# Patient Record
Sex: FEMALE | Race: WHITE | NOT HISPANIC OR LATINO | Employment: OTHER | URBAN - METROPOLITAN AREA
[De-identification: names, ages, dates, MRNs, and addresses within clinical notes are randomized per-mention and may not be internally consistent; named-entity substitution may affect disease eponyms.]

---

## 2017-01-10 ENCOUNTER — ALLSCRIPTS OFFICE VISIT (OUTPATIENT)
Dept: OTHER | Facility: OTHER | Age: 57
End: 2017-01-10

## 2017-01-10 DIAGNOSIS — F41.9 ANXIETY DISORDER: ICD-10-CM

## 2017-01-10 DIAGNOSIS — M81.0 AGE-RELATED OSTEOPOROSIS WITHOUT CURRENT PATHOLOGICAL FRACTURE: ICD-10-CM

## 2017-01-10 DIAGNOSIS — E78.5 HYPERLIPIDEMIA: ICD-10-CM

## 2017-01-10 DIAGNOSIS — E55.9 VITAMIN D DEFICIENCY: ICD-10-CM

## 2017-02-10 ENCOUNTER — APPOINTMENT (OUTPATIENT)
Dept: LAB | Facility: MEDICAL CENTER | Age: 57
End: 2017-02-10
Payer: COMMERCIAL

## 2017-02-10 ENCOUNTER — HOSPITAL ENCOUNTER (OUTPATIENT)
Dept: BONE DENSITY | Facility: MEDICAL CENTER | Age: 57
Discharge: HOME | End: 2017-02-10
Payer: COMMERCIAL

## 2017-02-10 ENCOUNTER — TRANSCRIBE ORDERS (OUTPATIENT)
Dept: ADMINISTRATIVE | Facility: HOSPITAL | Age: 57
End: 2017-02-10

## 2017-02-10 DIAGNOSIS — F41.9 ANXIETY DISORDER: ICD-10-CM

## 2017-02-10 DIAGNOSIS — M81.0 AGE-RELATED OSTEOPOROSIS WITHOUT CURRENT PATHOLOGICAL FRACTURE: ICD-10-CM

## 2017-02-10 DIAGNOSIS — E55.9 VITAMIN D DEFICIENCY: ICD-10-CM

## 2017-02-10 DIAGNOSIS — E78.5 HYPERLIPIDEMIA: ICD-10-CM

## 2017-02-10 LAB
25(OH)D3 SERPL-MCNC: 32.3 NG/ML (ref 30–100)
ALBUMIN SERPL BCP-MCNC: 4 G/DL (ref 3.5–5)
ALP SERPL-CCNC: 98 U/L (ref 46–116)
ALT SERPL W P-5'-P-CCNC: 49 U/L (ref 12–78)
ANION GAP SERPL CALCULATED.3IONS-SCNC: 8 MMOL/L (ref 4–13)
AST SERPL W P-5'-P-CCNC: 28 U/L (ref 5–45)
BILIRUB SERPL-MCNC: 0.55 MG/DL (ref 0.2–1)
BUN SERPL-MCNC: 15 MG/DL (ref 5–25)
CALCIUM SERPL-MCNC: 9.1 MG/DL (ref 8.3–10.1)
CHLORIDE SERPL-SCNC: 105 MMOL/L (ref 100–108)
CHOLEST SERPL-MCNC: 228 MG/DL (ref 50–200)
CO2 SERPL-SCNC: 29 MMOL/L (ref 21–32)
CREAT SERPL-MCNC: 0.79 MG/DL (ref 0.6–1.3)
GFR SERPL CREATININE-BSD FRML MDRD: >60 ML/MIN/1.73SQ M
GLUCOSE SERPL-MCNC: 79 MG/DL (ref 65–140)
HDLC SERPL-MCNC: 66 MG/DL (ref 40–60)
LDLC SERPL CALC-MCNC: 132 MG/DL (ref 0–100)
POTASSIUM SERPL-SCNC: 3.3 MMOL/L (ref 3.5–5.3)
PROT SERPL-MCNC: 7.7 G/DL (ref 6.4–8.2)
SODIUM SERPL-SCNC: 142 MMOL/L (ref 136–145)
TRIGL SERPL-MCNC: 149 MG/DL

## 2017-02-10 PROCEDURE — 82306 VITAMIN D 25 HYDROXY: CPT

## 2017-02-10 PROCEDURE — 77080 DXA BONE DENSITY AXIAL: CPT

## 2017-02-10 PROCEDURE — 80053 COMPREHEN METABOLIC PANEL: CPT

## 2017-02-10 PROCEDURE — 36415 COLL VENOUS BLD VENIPUNCTURE: CPT

## 2017-02-10 PROCEDURE — 80061 LIPID PANEL: CPT

## 2017-02-13 ENCOUNTER — GENERIC CONVERSION - ENCOUNTER (OUTPATIENT)
Dept: OTHER | Facility: OTHER | Age: 57
End: 2017-02-13

## 2017-07-10 DIAGNOSIS — M81.0 AGE-RELATED OSTEOPOROSIS WITHOUT CURRENT PATHOLOGICAL FRACTURE: ICD-10-CM

## 2017-07-10 DIAGNOSIS — E55.9 VITAMIN D DEFICIENCY: ICD-10-CM

## 2017-07-10 DIAGNOSIS — E78.5 HYPERLIPIDEMIA: ICD-10-CM

## 2017-07-10 DIAGNOSIS — F41.9 ANXIETY DISORDER: ICD-10-CM

## 2018-01-14 VITALS
HEIGHT: 66 IN | SYSTOLIC BLOOD PRESSURE: 138 MMHG | HEART RATE: 80 BPM | BODY MASS INDEX: 27.97 KG/M2 | WEIGHT: 174 LBS | DIASTOLIC BLOOD PRESSURE: 80 MMHG

## 2018-01-16 NOTE — RESULT NOTES
Message   Please call the patient and let her know I have reviewed her blood work and DEXA results  Her lipid panel shows that her LDL is still not low enough on her statin medication  I do want to increase her Lipitor to 20 mg daily and recheck in 6 months as ordered  Vitamin D level is great continue current vitamin D over-the-counter strength daily supplementation  DEXA scan shows she still does have osteoporosis with a T score at worst to -3 1  We did discuss that if this were the case patient would try oral medication and so would like to initiate Fosamax once weekly and repeat DEXA in 2 years  Blood work is ordered for 6 months and she should have this done about one week prior to her next appointment with me  Verified Results  * DXA BONE DENSITY SPINE HIP AND PELVIS [id number] 08:09AM Gisela Woo Order Number: [id number]    - Patient Instructions: To schedule this appointment, please contact Central Scheduling at [phone]  Test Name Result Flag Reference   DXA BONE DENSITY SPINE HIP AND PELVIS (Report)     CENTRAL DXA SCAN     CLINICAL HISTORY:  61year old post-menopausal  female risk factors include estrogen deficiency  Osteoporosis  TECHNIQUE: Bone densitometry was performed using a [employer] W bone densitometer  Regions of interest appear properly placed  There are no obvious fractures or other confounding variables which could limit the study  L1 is excluded from    evaluation due to the presence of degenerative or osteoarthritic changes noted on the spine image  COMPARISON: Follow-up     RESULTS:    LUMBAR SPINE: L2-L4:   BMD 0 736 gm/cm2   T-score below normal, -3 1, serious osteoporosis, and 5% less than 2014  Z-score -1 8     LEFT TOTAL HIP:   BMD 0 70 gm/cm2   T-score below normal, -1 9   Z-score -1 1     LEFT FEMORAL NECK:   BMD 0 616 gm/cm2   T-score below normal, -2 1 and 10% less than 2014, statistically significant change     Z-score -0 9     A 25 hydroxy vitamin D level, an intact PTH, and a comprehensive metabolic panel are suggested in this patient prior to treatment  Treatment with intravenous Reclast, or oral Bisphosphonate, or Prolia are suggested for improvement in the bone density  IMPRESSION:   1  Based on the [facility] classification, this study identifies spine osteoporosis with moderate total hip and femoral neck osteopenia and the patient is considered at elevated risk for fracture  2  A daily intake of calcium of at least 1200 mg and vitamin D, 800-1000 IU, as well as weight bearing and muscle strengthening exercise, fall prevention and avoidance of tobacco and excessive alcohol intake as basic preventive measures are recommended  3  Repeat DXA scan on the same equipment in 18-24 months as clinically indicated  The 10 year risk of hip fracture is 1 1%, with the 10 year risk of major osteoporotic fracture being 17%, as calculated by the [facility] fracture risk assessment tool (FRAX)  The current NOF guidelines recommend treating patients with FRAX 10 year risk score    of >3% for hip fracture and >20% for major osteoporotic fracture  WHO CLASSIFICATION:   Normal (a T-score of -1 0 or higher)   Low bone mineral density (a T-score of less than -1 0 but higher than -2 5)   Osteoporosis (a T-score of -2 5 or less)   Severe osteoporosis (a T-score of -2 5 or less with a fragility fracture)     Thank you for allowing us the opportunity to participate in your patient care  The expanded DEXA report will no longer be arriving in your mail  If you desire to view the full report please contact [address] [place] [address] or access the PACS system          Workstation performed: [id number]     Signed by:   Aydin Christopher MD   2/10/17     (1) LIPID PANEL FASTING W DIRECT LDL REFLEX [id number] 07:58AM Annika Swenson Order Number: [id number]     Test Name Result Flag Reference   CHOLESTEROL 228 mg/dL H    LDL CHOLESTEROL CALCULATED 132 mg/dL H 0-100   - Patient Instructions: This is a fasting blood test  Water, black tea or black coffee only after 9:00pm the night before test   Drink 2 glasses of water the morning of test     - Patient Instructions: This is a fasting blood test  Water, black tea or black coffee only after 9:00pm the night before test Drink 2 glasses of water the morning of test   Triglyceride:         Normal              <150 mg/dl       Borderline High    150-199 mg/dl       High               200-499 mg/dl       Very High          >499 mg/dl  Cholesterol:         Desirable        <200 mg/dl      Borderline High  200-239 mg/dl      High             >239 mg/dl  HDL Cholesterol:        High    >59 mg/dL      Low     <41 mg/dL  LDL Cholesterol:        Optimal          <100 mg/dl        Near Optimal     100-129 mg/dl        Above Optimal          Borderline High   130-159 mg/dl          High              160-189 mg/dl          Very High        >189 mg/dl  LDL CALCULATED:    This screening LDL is a calculated result  It does not have the accuracy of the Direct Measured LDL in the monitoring of patients with hyperlipidemia and/or statin therapy  Direct Measure LDL (ZIQ702) must be ordered separately in these patients  TRIGLYCERIDES 149 mg/dL  <=150   Specimen collection should occur prior to N-Acetylcysteine or Metamizole administration due to the potential for falsely depressed results  HDL,DIRECT 66 mg/dL H 40-60   Specimen collection should occur prior to Metamizole administration due to the potential for falsely depressed results       (1) VITAMIN D 25-HYDROXY [id number] 07:58AM Altamease Pacheco    Order Number: [id number]     Test Name Result Flag Reference   VIT D 25-HYDROX 32 3 ng/mL  30 0-100 0   This assay is a certified procedure of the CDC Vitamin D Standardization Certification Program (VDSCP)     Deficiency <20ng/ml   Insufficiency 20-30ng/ml   Sufficient  ng/ml     *Patients undergoing fluorescein dye angiography may retain small amounts of fluorescein in the body for 48-72 hours post procedure  Samples containing fluorescein can produce falsely elevated Vitamin D values  If the patient had this procedure, a specimen should be resubmitted post fluorescein clearance  (1) COMPREHENSIVE METABOLIC PANEL [id number] 67:87CE Tarsha Duron    Order Number: [id number]     Test Name Result Flag Reference   GLUCOSE,RANDM 79 mg/dL     If the patient is fasting, the ADA then defines impaired fasting glucose as > 100 mg/dL and diabetes as > or equal to 123 mg/dL  SODIUM 142 mmol/L  136-145   POTASSIUM 3 3 mmol/L L 3 5-5 3   CHLORIDE 105 mmol/L  100-108   CARBON DIOXIDE 29 mmol/L  21-32   ANION GAP (CALC) 8 mmol/L  4-13   BLOOD UREA NITROGEN 15 mg/dL  5-25   CREATININE 0 79 mg/dL  0 60-1 30   Standardized to IDMS reference method   CALCIUM 9 1 mg/dL  8 3-10 1   BILI, TOTAL 0 55 mg/dL  0 20-1 00   ALK PHOSPHATAS 98 U/L     ALT (SGPT) 49 U/L  12-78   AST(SGOT) 28 U/L  5-45   ALBUMIN 4 0 g/dL  3 5-5 0   TOTAL PROTEIN 7 7 g/dL  6 4-8 2   eGFR Non-African American      >60 0 ml/min/1 73sq m   - Patient Instructions: This is a fasting blood test  Water, black tea or black coffee only after 9:00pm the night before test Drink 2 glasses of water the morning of test   National Kidney Disease Education Program recommendations are as follows:  GFR calculation is accurate only with a steady state creatinine  Chronic Kidney disease less than 60 ml/min/1 73 sq  meters  Kidney failure less than 15 ml/min/1 73 sq  meters         Plan  Hyperlipidemia    · From  Atorvastatin Calcium 10 MG Oral Tablet Take 1 tablet daily To  Atorvastatin Calcium 20 MG Oral Tablet TAKE 1 TABLET AT BEDTIME  Osteoporosis    · Alendronate Sodium 70 MG Oral Tablet; TAKE 1 TABLET ONCE WEEKLY   · * DXA BONE DENSITY SPINE HIP AND PELVIS; Status:Hold For - Scheduling;  Requested for:[id number]; Signatures   Electronically signed by : Jemma Cabezas, [facility]; Feb 13 2017  4:02PM EST                       (Author)

## 2018-01-27 NOTE — TELEPHONE ENCOUNTER
Pt  Needs labs and an OV to review  Please give her only 30 /0 ref  At a local pharmacy and then task me to make orders for labs as I can not find her chart after I send this!

## 2018-01-29 NOTE — TELEPHONE ENCOUNTER
LMOM for pt  To call back  Need a local pharmacy she would like to use and also inform her she needs OV and blood work done

## 2018-02-02 DIAGNOSIS — F32.89 OTHER DEPRESSION: Primary | ICD-10-CM

## 2018-02-02 RX ORDER — FLUOXETINE HYDROCHLORIDE 40 MG/1
CAPSULE ORAL
Qty: 90 CAPSULE | Refills: 0 | Status: SHIPPED | OUTPATIENT
Start: 2018-02-02 | End: 2018-02-27 | Stop reason: SDUPTHER

## 2018-02-07 RX ORDER — ATORVASTATIN CALCIUM 20 MG/1
TABLET, FILM COATED ORAL
Qty: 90 TABLET | Refills: 3 | OUTPATIENT
Start: 2018-02-07

## 2018-02-26 PROBLEM — M81.0 OSTEOPOROSIS: Status: ACTIVE | Noted: 2017-01-10

## 2018-02-27 ENCOUNTER — OFFICE VISIT (OUTPATIENT)
Dept: FAMILY MEDICINE CLINIC | Facility: CLINIC | Age: 58
End: 2018-02-27
Payer: COMMERCIAL

## 2018-02-27 VITALS
BODY MASS INDEX: 26.29 KG/M2 | HEART RATE: 64 BPM | WEIGHT: 163.6 LBS | SYSTOLIC BLOOD PRESSURE: 122 MMHG | DIASTOLIC BLOOD PRESSURE: 70 MMHG | HEIGHT: 66 IN

## 2018-02-27 DIAGNOSIS — E78.2 MIXED HYPERLIPIDEMIA: ICD-10-CM

## 2018-02-27 DIAGNOSIS — F32.89 OTHER DEPRESSION: ICD-10-CM

## 2018-02-27 DIAGNOSIS — M81.0 AGE-RELATED OSTEOPOROSIS WITHOUT CURRENT PATHOLOGICAL FRACTURE: Primary | ICD-10-CM

## 2018-02-27 DIAGNOSIS — F41.9 ANXIETY: ICD-10-CM

## 2018-02-27 DIAGNOSIS — Z12.39 BREAST CANCER SCREENING: ICD-10-CM

## 2018-02-27 DIAGNOSIS — E55.9 VITAMIN D DEFICIENCY: ICD-10-CM

## 2018-02-27 DIAGNOSIS — J30.9 ALLERGIC RHINITIS, UNSPECIFIED CHRONICITY, UNSPECIFIED SEASONALITY, UNSPECIFIED TRIGGER: ICD-10-CM

## 2018-02-27 PROCEDURE — 99214 OFFICE O/P EST MOD 30 MIN: CPT | Performed by: PHYSICIAN ASSISTANT

## 2018-02-27 PROCEDURE — 3008F BODY MASS INDEX DOCD: CPT | Performed by: PHYSICIAN ASSISTANT

## 2018-02-27 RX ORDER — ALPRAZOLAM 0.5 MG/1
0.5 TABLET ORAL 2 TIMES DAILY PRN
Qty: 30 TABLET | Refills: 0 | Status: SHIPPED | OUTPATIENT
Start: 2018-02-27 | End: 2018-09-04 | Stop reason: SDUPTHER

## 2018-02-27 RX ORDER — FLUOXETINE HYDROCHLORIDE 40 MG/1
40 CAPSULE ORAL DAILY
Qty: 90 CAPSULE | Refills: 3 | Status: SHIPPED | OUTPATIENT
Start: 2018-02-27 | End: 2019-04-08 | Stop reason: SDUPTHER

## 2018-02-27 RX ORDER — ATORVASTATIN CALCIUM 20 MG/1
20 TABLET, FILM COATED ORAL DAILY
Qty: 90 TABLET | Refills: 3 | Status: SHIPPED | OUTPATIENT
Start: 2018-02-27 | End: 2019-02-24 | Stop reason: SDUPTHER

## 2018-02-27 RX ORDER — ERGOCALCIFEROL (VITAMIN D2) 10 MCG
1 TABLET ORAL DAILY
COMMUNITY

## 2018-02-27 RX ORDER — FLUTICASONE PROPIONATE 50 MCG
2 SPRAY, SUSPENSION (ML) NASAL DAILY
Qty: 16 G | Refills: 11 | Status: SHIPPED | OUTPATIENT
Start: 2018-02-27 | End: 2018-09-04 | Stop reason: ALTCHOICE

## 2018-02-27 RX ORDER — ATORVASTATIN CALCIUM 20 MG/1
1 TABLET, FILM COATED ORAL
COMMUNITY
Start: 2015-03-02 | End: 2018-02-27 | Stop reason: SDUPTHER

## 2018-02-27 RX ORDER — ALPRAZOLAM 0.5 MG/1
1 TABLET ORAL
COMMUNITY
Start: 2015-03-02 | End: 2018-02-27 | Stop reason: SDUPTHER

## 2018-02-27 RX ORDER — VITAMIN E 268 MG
CAPSULE ORAL
COMMUNITY

## 2018-02-27 RX ORDER — FLUTICASONE PROPIONATE 50 MCG
SPRAY, SUSPENSION (ML) NASAL
COMMUNITY
Start: 2011-09-24 | End: 2018-02-27 | Stop reason: SDUPTHER

## 2018-02-27 NOTE — PATIENT INSTRUCTIONS
Problem List Items Addressed This Visit     Anxiety    Mixed hyperlipidemia     Overdue for labs  Check now and again in 6 months continue current medication call with results  Relevant Medications    atorvastatin (LIPITOR) 20 mg tablet    Other Relevant Orders    Lipid Panel with Direct LDL reflex    Comprehensive metabolic panel    Comprehensive metabolic panel    Lipid Panel with Direct LDL reflex    Vitamin D deficiency     Check level now and again in 6 months continue supplementation  Relevant Orders    Vitamin D 25 hydroxy    Vitamin D 25 hydroxy    Age-related osteoporosis without current pathological fracture - Primary     DEXA is due February 2019           Breast cancer screening    Relevant Orders    Mammo screening bilateral w cad

## 2018-02-27 NOTE — PROGRESS NOTES
Assessment/Plan:    Age-related osteoporosis without current pathological fracture  DEXA is due February 2019  Vitamin D deficiency  Check level now and again in 6 months continue supplementation  Mixed hyperlipidemia  Overdue for labs  Check now and again in 6 months continue current medication call with results  Diagnoses and all orders for this visit:    Age-related osteoporosis without current pathological fracture    Mixed hyperlipidemia  -     Lipid Panel with Direct LDL reflex; Future  -     Comprehensive metabolic panel; Future  -     Comprehensive metabolic panel; Future  -     Lipid Panel with Direct LDL reflex; Future    Vitamin D deficiency  -     Vitamin D 25 hydroxy; Future  -     Vitamin D 25 hydroxy; Future    Anxiety    Breast cancer screening  -     Mammo screening bilateral w cad; Future    Other orders  -     ALPRAZolam (XANAX) 0 5 mg tablet; Take 1 tablet by mouth  -     aspirin 81 MG tablet; Take 1 tablet by mouth daily  -     atorvastatin (LIPITOR) 20 mg tablet; Take 1 tablet by mouth  -     Cholecalciferol ([employer] VITAMIN D) 2000 units CAPS; Take 1 capsule by mouth daily  -     vitamin E, tocopherol, 400 units capsule; Take by mouth  -     Multiple Vitamin (DAILY VALUE MULTIVITAMIN) TABS; Take 1 tablet by mouth daily  -     Omega-3 Fatty Acids (FISH OIL) 600 MG CAPS; Take by mouth  -     fluticasone (FLONASE) 50 mcg/act nasal spray; into each nostril          Subjective:   CC: c/o Pt  Here for routine follow up  No concerns noted  rayo     Patient ID: Suly Stark is a 61 y o  female  Suly Stark is here for chronic conditions f/u including the diagnosis of No diagnosis found    Pt  states they are taking all medications as directed without complaints or side effects             The following portions of the patient's history were reviewed and updated as appropriate: allergies, current medications, past family history, past medical history, past social history, past surgical history and problem list     Review of Systems   Constitutional: Negative  HENT: Negative  Eyes: Negative  Respiratory: Negative  Cardiovascular: Negative  Gastrointestinal: Negative  Endocrine: Negative  Genitourinary: Negative  Musculoskeletal: Negative  Skin: Negative  Allergic/Immunologic: Negative  Neurological: Negative  Hematological: Negative  Psychiatric/Behavioral: Negative  Objective:      Vitals:    02/27/18 1303   BP: 122/70   BP Location: Left arm   Patient Position: Sitting   Pulse: 64   Weight: 74 2 kg (163 lb 9 6 oz)   Height: 5' 6 25" (1 683 m)            Physical Exam   Constitutional: She is oriented to person, place, and time  She appears well-developed and well-nourished  No distress  HENT:   Head: Normocephalic and atraumatic  Eyes: Conjunctivae are normal  Right eye exhibits no discharge  Left eye exhibits no discharge  Neck: Neck supple  Carotid bruit is not present  Cardiovascular: Normal rate, regular rhythm and normal heart sounds  Exam reveals no gallop and no friction rub  No murmur heard  Pulmonary/Chest: Effort normal and breath sounds normal  No respiratory distress  She has no wheezes  She has no rales  Neurological: She is alert and oriented to person, place, and time  Skin: Skin is warm and dry  She is not diaphoretic  Psychiatric: She has a normal mood and affect  Judgment normal    Nursing note and vitals reviewed

## 2018-05-08 ENCOUNTER — TRANSCRIBE ORDERS (OUTPATIENT)
Dept: ADMINISTRATIVE | Facility: HOSPITAL | Age: 58
End: 2018-05-08

## 2018-05-08 ENCOUNTER — APPOINTMENT (OUTPATIENT)
Dept: LAB | Facility: MEDICAL CENTER | Age: 58
End: 2018-05-08
Payer: COMMERCIAL

## 2018-05-08 DIAGNOSIS — E55.9 VITAMIN D DEFICIENCY: ICD-10-CM

## 2018-05-08 DIAGNOSIS — E78.2 MIXED HYPERLIPIDEMIA: ICD-10-CM

## 2018-05-08 LAB
25(OH)D3 SERPL-MCNC: 26.3 NG/ML (ref 30–100)
ALBUMIN SERPL BCP-MCNC: 3.8 G/DL (ref 3.5–5)
ALP SERPL-CCNC: 88 U/L (ref 46–116)
ALT SERPL W P-5'-P-CCNC: 42 U/L (ref 12–78)
ANION GAP SERPL CALCULATED.3IONS-SCNC: 6 MMOL/L (ref 4–13)
AST SERPL W P-5'-P-CCNC: 22 U/L (ref 5–45)
BILIRUB SERPL-MCNC: 0.67 MG/DL (ref 0.2–1)
BUN SERPL-MCNC: 17 MG/DL (ref 5–25)
CALCIUM SERPL-MCNC: 9.3 MG/DL (ref 8.3–10.1)
CHLORIDE SERPL-SCNC: 107 MMOL/L (ref 100–108)
CHOLEST SERPL-MCNC: 188 MG/DL (ref 50–200)
CO2 SERPL-SCNC: 28 MMOL/L (ref 21–32)
CREAT SERPL-MCNC: 0.72 MG/DL (ref 0.6–1.3)
GFR SERPL CREATININE-BSD FRML MDRD: 92 ML/MIN/1.73SQ M
GLUCOSE P FAST SERPL-MCNC: 93 MG/DL (ref 65–99)
HDLC SERPL-MCNC: 61 MG/DL (ref 40–60)
LDLC SERPL CALC-MCNC: 108 MG/DL (ref 0–100)
POTASSIUM SERPL-SCNC: 3.6 MMOL/L (ref 3.5–5.3)
PROT SERPL-MCNC: 7.3 G/DL (ref 6.4–8.2)
SODIUM SERPL-SCNC: 141 MMOL/L (ref 136–145)
TRIGL SERPL-MCNC: 94 MG/DL

## 2018-05-08 PROCEDURE — 36415 COLL VENOUS BLD VENIPUNCTURE: CPT

## 2018-05-08 PROCEDURE — 80061 LIPID PANEL: CPT

## 2018-05-08 PROCEDURE — 80053 COMPREHEN METABOLIC PANEL: CPT

## 2018-05-08 PROCEDURE — 82306 VITAMIN D 25 HYDROXY: CPT

## 2018-05-10 ENCOUNTER — TELEPHONE (OUTPATIENT)
Dept: FAMILY MEDICINE CLINIC | Facility: CLINIC | Age: 58
End: 2018-05-10

## 2018-08-31 ENCOUNTER — APPOINTMENT (OUTPATIENT)
Dept: LAB | Facility: MEDICAL CENTER | Age: 58
End: 2018-08-31
Payer: COMMERCIAL

## 2018-08-31 DIAGNOSIS — E55.9 VITAMIN D DEFICIENCY: ICD-10-CM

## 2018-08-31 DIAGNOSIS — E78.2 MIXED HYPERLIPIDEMIA: ICD-10-CM

## 2018-08-31 LAB
25(OH)D3 SERPL-MCNC: 37.3 NG/ML (ref 30–100)
ALBUMIN SERPL BCP-MCNC: 3.8 G/DL (ref 3.5–5)
ALP SERPL-CCNC: 93 U/L (ref 46–116)
ALT SERPL W P-5'-P-CCNC: 47 U/L (ref 12–78)
ANION GAP SERPL CALCULATED.3IONS-SCNC: 6 MMOL/L (ref 4–13)
AST SERPL W P-5'-P-CCNC: 24 U/L (ref 5–45)
BILIRUB SERPL-MCNC: 0.68 MG/DL (ref 0.2–1)
BUN SERPL-MCNC: 14 MG/DL (ref 5–25)
CALCIUM SERPL-MCNC: 9 MG/DL (ref 8.3–10.1)
CHLORIDE SERPL-SCNC: 103 MMOL/L (ref 100–108)
CHOLEST SERPL-MCNC: 221 MG/DL (ref 50–200)
CO2 SERPL-SCNC: 29 MMOL/L (ref 21–32)
CREAT SERPL-MCNC: 0.77 MG/DL (ref 0.6–1.3)
GFR SERPL CREATININE-BSD FRML MDRD: 85 ML/MIN/1.73SQ M
GLUCOSE P FAST SERPL-MCNC: 83 MG/DL (ref 65–99)
HDLC SERPL-MCNC: 59 MG/DL (ref 40–60)
LDLC SERPL CALC-MCNC: 124 MG/DL (ref 0–100)
POTASSIUM SERPL-SCNC: 3.7 MMOL/L (ref 3.5–5.3)
PROT SERPL-MCNC: 7.4 G/DL (ref 6.4–8.2)
SODIUM SERPL-SCNC: 138 MMOL/L (ref 136–145)
TRIGL SERPL-MCNC: 191 MG/DL

## 2018-08-31 PROCEDURE — 80061 LIPID PANEL: CPT

## 2018-08-31 PROCEDURE — 36415 COLL VENOUS BLD VENIPUNCTURE: CPT

## 2018-08-31 PROCEDURE — 82306 VITAMIN D 25 HYDROXY: CPT

## 2018-08-31 PROCEDURE — 80053 COMPREHEN METABOLIC PANEL: CPT

## 2018-09-04 ENCOUNTER — OFFICE VISIT (OUTPATIENT)
Dept: FAMILY MEDICINE CLINIC | Facility: CLINIC | Age: 58
End: 2018-09-04
Payer: COMMERCIAL

## 2018-09-04 VITALS
SYSTOLIC BLOOD PRESSURE: 124 MMHG | BODY MASS INDEX: 26.08 KG/M2 | DIASTOLIC BLOOD PRESSURE: 70 MMHG | WEIGHT: 162.8 LBS | HEART RATE: 72 BPM

## 2018-09-04 DIAGNOSIS — F41.9 ANXIETY: ICD-10-CM

## 2018-09-04 DIAGNOSIS — E55.9 VITAMIN D DEFICIENCY: Primary | ICD-10-CM

## 2018-09-04 DIAGNOSIS — E78.2 MIXED HYPERLIPIDEMIA: ICD-10-CM

## 2018-09-04 DIAGNOSIS — M81.0 AGE-RELATED OSTEOPOROSIS WITHOUT CURRENT PATHOLOGICAL FRACTURE: ICD-10-CM

## 2018-09-04 DIAGNOSIS — Z12.39 BREAST CANCER SCREENING: ICD-10-CM

## 2018-09-04 PROCEDURE — 99214 OFFICE O/P EST MOD 30 MIN: CPT | Performed by: PHYSICIAN ASSISTANT

## 2018-09-04 PROCEDURE — 1036F TOBACCO NON-USER: CPT | Performed by: PHYSICIAN ASSISTANT

## 2018-09-04 RX ORDER — ALPRAZOLAM 0.5 MG/1
0.5 TABLET ORAL 2 TIMES DAILY PRN
Qty: 30 TABLET | Refills: 0 | Status: SHIPPED | OUTPATIENT
Start: 2018-09-04 | End: 2019-04-16 | Stop reason: SDUPTHER

## 2018-09-04 NOTE — ASSESSMENT & PLAN NOTE
however triglycerides elevated at 191  Decrease carbohydrate intake continue low-fat low-cholesterol diet and medication and recheck in 6 months

## 2018-09-04 NOTE — PATIENT INSTRUCTIONS
Problem List Items Addressed This Visit        Musculoskeletal and Integument    Age-related osteoporosis without current pathological fracture     Dexa due 2/2019  Other    Anxiety    Relevant Medications    ALPRAZolam (XANAX) 0 5 mg tablet    Mixed hyperlipidemia      however triglycerides elevated at 191  Decrease carbohydrate intake continue low-fat low-cholesterol diet and medication and recheck in 6 months  Relevant Orders    Lipid Panel with Direct LDL reflex    Comprehensive metabolic panel    Vitamin D deficiency - Primary     Stable with a vitamin-D level of 37  Recheck 1 year continue current supplementation  Relevant Orders    Vitamin D 25 hydroxy    Breast cancer screening     Mammo overdue

## 2018-09-04 NOTE — PROGRESS NOTES
Assessment/Plan:    Age-related osteoporosis without current pathological fracture  Dexa due 2/2019  Breast cancer screening  Mammo overdue  Mixed hyperlipidemia   however triglycerides elevated at 191  Decrease carbohydrate intake continue low-fat low-cholesterol diet and medication and recheck in 6 months  Vitamin D deficiency  Stable with a vitamin-D level of 37  Recheck 1 year continue current supplementation  Diagnoses and all orders for this visit:    Vitamin D deficiency  -     Vitamin D 25 hydroxy; Future    Mixed hyperlipidemia  -     Lipid Panel with Direct LDL reflex; Future  -     Comprehensive metabolic panel; Future    Age-related osteoporosis without current pathological fracture    Anxiety  -     ALPRAZolam (XANAX) 0 5 mg tablet; Take 1 tablet (0 5 mg total) by mouth 2 (two) times a day as needed for anxiety for up to 30 days    Breast cancer screening    Other orders  -     Cancel: Lipid Panel with Direct LDL reflex; Future  -     Cancel: Comprehensive metabolic panel; Future  -     Cancel: Vitamin D 25 hydroxy; Future          Subjective:   CC: 6 month follow up for chronic conditions and review blood work  rayo     Patient ID: Hilda Flores is a 60 y o  female  Hilda Flores is here for chronic conditions f/u including the diagnosis of Vitamin d deficiency  (primary encounter diagnosis)  Mixed hyperlipidemia  Age-related osteoporosis without current pathological fracture  Anxiety  Breast cancer screening   Pt  states they are taking all medications as directed without complaints or side effects   Pt  had labs done prior to today's visit which included Recent Results (from the past 672 hour(s))  -Comprehensive metabolic panel  Collection Time: 08/31/18 10:42 AM       Result                      Value             Ref Range           Sodium                      138               136 - 145 mm*       Potassium                   3 7               3 5 - 5 3 mm*       Chloride 103               100 - 108 mm*       CO2                         29                21 - 32 mmol*       ANION GAP                   6                 4 - 13 mmol/L       BUN                         14                5 - 25 mg/dL        Creatinine                  0 77              0 60 - 1 30 *       Glucose, Fasting            83                65 - 99 mg/dL       Calcium                     9 0               8 3 - 10 1 m*       AST                         24                5 - 45 U/L          ALT                         47                12 - 78 U/L         Alkaline Phosphatase        93                46 - 116 U/L        Total Protein               7 4               6 4 - 8 2 g/*       Albumin                     3 8               3 5 - 5 0 g/*       Total Bilirubin             0 68              0 20 - 1 00 *       eGFR                        85                ml/min/1 73s*  -Lipid Panel with Direct LDL reflex  Collection Time: 08/31/18 10:42 AM       Result                      Value             Ref Range           Cholesterol                 221 (H)           50 - 200 mg/*       Triglycerides               191 (H)           <=150 mg/dL         HDL, Direct                 59                40 - 60 mg/dL       LDL Calculated              124 (H)           0 - 100 mg/dL  -Vitamin D 25 hydroxy  Collection Time: 08/31/18 10:42 AM       Result                      Value             Ref Range           Vit D, 25-Hydroxy           37 3              30 0 - 100 0*      Pt  admits to eating a lot of crackers and cheese since this summer when sitting out on the porch with her   The following portions of the patient's history were reviewed and updated as appropriate: allergies, current medications, past family history, past medical history, past social history, past surgical history and problem list     Review of Systems   Constitutional: Negative  HENT: Negative  Eyes: Negative  Respiratory: Negative  Cardiovascular: Negative  Gastrointestinal: Negative  Endocrine: Negative  Genitourinary: Negative  Musculoskeletal: Negative  Skin: Negative  Allergic/Immunologic: Negative  Neurological: Negative  Hematological: Negative  Psychiatric/Behavioral: Negative  Objective:      Vitals:    09/04/18 1203   BP: 124/70   BP Location: Left arm   Patient Position: Sitting   Pulse: 72   Weight: 73 8 kg (162 lb 12 8 oz)            Physical Exam   Constitutional: She is oriented to person, place, and time  She appears well-developed and well-nourished  No distress  HENT:   Head: Normocephalic and atraumatic  Eyes: Conjunctivae are normal  Right eye exhibits no discharge  Left eye exhibits no discharge  Neck: Neck supple  Carotid bruit is not present  Cardiovascular: Normal rate, regular rhythm and normal heart sounds  Exam reveals no gallop and no friction rub  No murmur heard  Pulmonary/Chest: Effort normal and breath sounds normal  No respiratory distress  She has no wheezes  She has no rales  Neurological: She is alert and oriented to person, place, and time  Skin: Skin is warm and dry  She is not diaphoretic  Psychiatric: She has a normal mood and affect  Judgment normal    Nursing note and vitals reviewed

## 2019-02-13 DIAGNOSIS — M81.0 AGE-RELATED OSTEOPOROSIS WITHOUT CURRENT PATHOLOGICAL FRACTURE: ICD-10-CM

## 2019-02-24 DIAGNOSIS — E78.2 MIXED HYPERLIPIDEMIA: ICD-10-CM

## 2019-02-24 RX ORDER — ATORVASTATIN CALCIUM 20 MG/1
TABLET, FILM COATED ORAL
Qty: 90 TABLET | Refills: 3 | Status: SHIPPED | OUTPATIENT
Start: 2019-02-24 | End: 2019-04-16 | Stop reason: SDUPTHER

## 2019-04-08 DIAGNOSIS — F32.89 OTHER DEPRESSION: ICD-10-CM

## 2019-04-08 RX ORDER — FLUOXETINE HYDROCHLORIDE 40 MG/1
CAPSULE ORAL
Qty: 90 CAPSULE | Refills: 3 | Status: SHIPPED | OUTPATIENT
Start: 2019-04-08 | End: 2019-04-16 | Stop reason: SDUPTHER

## 2019-04-09 ENCOUNTER — TRANSCRIBE ORDERS (OUTPATIENT)
Dept: ADMINISTRATIVE | Facility: HOSPITAL | Age: 59
End: 2019-04-09

## 2019-04-09 DIAGNOSIS — M81.0 SENILE OSTEOPOROSIS: Primary | ICD-10-CM

## 2019-04-10 ENCOUNTER — APPOINTMENT (OUTPATIENT)
Dept: LAB | Facility: MEDICAL CENTER | Age: 59
End: 2019-04-10
Payer: COMMERCIAL

## 2019-04-10 DIAGNOSIS — E78.2 MIXED HYPERLIPIDEMIA: ICD-10-CM

## 2019-04-10 DIAGNOSIS — E55.9 VITAMIN D DEFICIENCY: ICD-10-CM

## 2019-04-10 LAB
25(OH)D3 SERPL-MCNC: 45.1 NG/ML (ref 30–100)
ALBUMIN SERPL BCP-MCNC: 4.2 G/DL (ref 3.5–5)
ALP SERPL-CCNC: 94 U/L (ref 46–116)
ALT SERPL W P-5'-P-CCNC: 41 U/L (ref 12–78)
ANION GAP SERPL CALCULATED.3IONS-SCNC: 5 MMOL/L (ref 4–13)
AST SERPL W P-5'-P-CCNC: 23 U/L (ref 5–45)
BILIRUB SERPL-MCNC: 0.81 MG/DL (ref 0.2–1)
BUN SERPL-MCNC: 13 MG/DL (ref 5–25)
CALCIUM SERPL-MCNC: 9.2 MG/DL (ref 8.3–10.1)
CHLORIDE SERPL-SCNC: 107 MMOL/L (ref 100–108)
CHOLEST SERPL-MCNC: 243 MG/DL (ref 50–200)
CO2 SERPL-SCNC: 29 MMOL/L (ref 21–32)
CREAT SERPL-MCNC: 0.78 MG/DL (ref 0.6–1.3)
GFR SERPL CREATININE-BSD FRML MDRD: 83 ML/MIN/1.73SQ M
GLUCOSE P FAST SERPL-MCNC: 87 MG/DL (ref 65–99)
HDLC SERPL-MCNC: 67 MG/DL (ref 40–60)
LDLC SERPL CALC-MCNC: 149 MG/DL (ref 0–100)
POTASSIUM SERPL-SCNC: 4.2 MMOL/L (ref 3.5–5.3)
PROT SERPL-MCNC: 7.8 G/DL (ref 6.4–8.2)
SODIUM SERPL-SCNC: 141 MMOL/L (ref 136–145)
TRIGL SERPL-MCNC: 137 MG/DL

## 2019-04-10 PROCEDURE — 80053 COMPREHEN METABOLIC PANEL: CPT

## 2019-04-10 PROCEDURE — 82306 VITAMIN D 25 HYDROXY: CPT

## 2019-04-10 PROCEDURE — 80061 LIPID PANEL: CPT

## 2019-04-10 PROCEDURE — 36415 COLL VENOUS BLD VENIPUNCTURE: CPT

## 2019-04-16 ENCOUNTER — OFFICE VISIT (OUTPATIENT)
Dept: FAMILY MEDICINE CLINIC | Facility: CLINIC | Age: 59
End: 2019-04-16
Payer: COMMERCIAL

## 2019-04-16 VITALS
SYSTOLIC BLOOD PRESSURE: 122 MMHG | WEIGHT: 159.8 LBS | HEART RATE: 84 BPM | HEIGHT: 66 IN | DIASTOLIC BLOOD PRESSURE: 70 MMHG | BODY MASS INDEX: 25.68 KG/M2

## 2019-04-16 DIAGNOSIS — M81.0 AGE-RELATED OSTEOPOROSIS WITHOUT CURRENT PATHOLOGICAL FRACTURE: ICD-10-CM

## 2019-04-16 DIAGNOSIS — F41.9 ANXIETY: ICD-10-CM

## 2019-04-16 DIAGNOSIS — E55.9 VITAMIN D DEFICIENCY: Primary | ICD-10-CM

## 2019-04-16 DIAGNOSIS — F32.89 OTHER DEPRESSION: ICD-10-CM

## 2019-04-16 DIAGNOSIS — Z12.31 SCREENING MAMMOGRAM, ENCOUNTER FOR: ICD-10-CM

## 2019-04-16 DIAGNOSIS — E78.2 MIXED HYPERLIPIDEMIA: ICD-10-CM

## 2019-04-16 PROCEDURE — 3008F BODY MASS INDEX DOCD: CPT | Performed by: PHYSICIAN ASSISTANT

## 2019-04-16 PROCEDURE — 99214 OFFICE O/P EST MOD 30 MIN: CPT | Performed by: PHYSICIAN ASSISTANT

## 2019-04-16 RX ORDER — ATORVASTATIN CALCIUM 40 MG/1
20 TABLET, FILM COATED ORAL DAILY
Qty: 90 TABLET | Refills: 3 | Status: SHIPPED | OUTPATIENT
Start: 2019-04-16 | End: 2019-06-12 | Stop reason: DRUGHIGH

## 2019-04-16 RX ORDER — ALPRAZOLAM 0.5 MG/1
0.5 TABLET ORAL 2 TIMES DAILY PRN
Qty: 30 TABLET | Refills: 0 | Status: SHIPPED | OUTPATIENT
Start: 2019-04-16 | End: 2020-05-05 | Stop reason: SDUPTHER

## 2019-04-16 RX ORDER — FLUOXETINE HYDROCHLORIDE 40 MG/1
40 CAPSULE ORAL DAILY
Qty: 90 CAPSULE | Refills: 3 | Status: SHIPPED | OUTPATIENT
Start: 2019-04-16 | End: 2020-06-09

## 2019-05-16 ENCOUNTER — HOSPITAL ENCOUNTER (OUTPATIENT)
Dept: BONE DENSITY | Facility: MEDICAL CENTER | Age: 59
Discharge: HOME | End: 2019-05-16
Payer: COMMERCIAL

## 2019-05-16 ENCOUNTER — HOSPITAL ENCOUNTER (OUTPATIENT)
Dept: MAMMOGRAPHY | Facility: MEDICAL CENTER | Age: 59
Discharge: HOME | End: 2019-05-16
Payer: COMMERCIAL

## 2019-05-16 VITALS — HEIGHT: 66 IN | WEIGHT: 159.75 LBS | BODY MASS INDEX: 25.67 KG/M2

## 2019-05-16 DIAGNOSIS — M81.0 SENILE OSTEOPOROSIS: ICD-10-CM

## 2019-05-16 DIAGNOSIS — Z12.31 SCREENING MAMMOGRAM, ENCOUNTER FOR: ICD-10-CM

## 2019-05-16 PROCEDURE — 77063 BREAST TOMOSYNTHESIS BI: CPT

## 2019-05-16 PROCEDURE — 77080 DXA BONE DENSITY AXIAL: CPT

## 2019-05-16 PROCEDURE — 77067 SCR MAMMO BI INCL CAD: CPT

## 2019-05-21 ENCOUNTER — TELEPHONE (OUTPATIENT)
Dept: FAMILY MEDICINE CLINIC | Facility: CLINIC | Age: 59
End: 2019-05-21

## 2019-05-21 DIAGNOSIS — M81.0 AGE-RELATED OSTEOPOROSIS WITHOUT CURRENT PATHOLOGICAL FRACTURE: Primary | ICD-10-CM

## 2019-06-12 DIAGNOSIS — E78.2 MIXED HYPERLIPIDEMIA: Primary | ICD-10-CM

## 2019-06-13 RX ORDER — ATORVASTATIN CALCIUM 40 MG/1
40 TABLET, FILM COATED ORAL DAILY
Qty: 90 TABLET | Refills: 3 | Status: SHIPPED | OUTPATIENT
Start: 2019-06-13 | End: 2020-06-09

## 2019-07-25 ENCOUNTER — APPOINTMENT (OUTPATIENT)
Dept: LAB | Facility: CLINIC | Age: 59
End: 2019-07-25
Payer: COMMERCIAL

## 2019-07-25 DIAGNOSIS — E78.2 MIXED HYPERLIPIDEMIA: ICD-10-CM

## 2019-07-25 LAB
ALBUMIN SERPL BCP-MCNC: 4 G/DL (ref 3.5–5)
ALP SERPL-CCNC: 98 U/L (ref 46–116)
ALT SERPL W P-5'-P-CCNC: 57 U/L (ref 12–78)
ANION GAP SERPL CALCULATED.3IONS-SCNC: 5 MMOL/L (ref 4–13)
AST SERPL W P-5'-P-CCNC: 31 U/L (ref 5–45)
BILIRUB SERPL-MCNC: 0.52 MG/DL (ref 0.2–1)
BUN SERPL-MCNC: 14 MG/DL (ref 5–25)
CALCIUM SERPL-MCNC: 8.9 MG/DL (ref 8.3–10.1)
CHLORIDE SERPL-SCNC: 104 MMOL/L (ref 100–108)
CHOLEST SERPL-MCNC: 200 MG/DL (ref 50–200)
CO2 SERPL-SCNC: 28 MMOL/L (ref 21–32)
CREAT SERPL-MCNC: 0.77 MG/DL (ref 0.6–1.3)
GFR SERPL CREATININE-BSD FRML MDRD: 84 ML/MIN/1.73SQ M
GLUCOSE P FAST SERPL-MCNC: 80 MG/DL (ref 65–99)
HDLC SERPL-MCNC: 57 MG/DL (ref 40–60)
LDLC SERPL CALC-MCNC: 119 MG/DL (ref 0–100)
POTASSIUM SERPL-SCNC: 3.7 MMOL/L (ref 3.5–5.3)
PROT SERPL-MCNC: 7.4 G/DL (ref 6.4–8.2)
SODIUM SERPL-SCNC: 137 MMOL/L (ref 136–145)
TRIGL SERPL-MCNC: 118 MG/DL

## 2019-07-25 PROCEDURE — 36415 COLL VENOUS BLD VENIPUNCTURE: CPT

## 2019-07-25 PROCEDURE — 80061 LIPID PANEL: CPT

## 2019-07-25 PROCEDURE — 80053 COMPREHEN METABOLIC PANEL: CPT

## 2019-07-31 ENCOUNTER — CONSULT (OUTPATIENT)
Dept: ENDOCRINOLOGY | Facility: CLINIC | Age: 59
End: 2019-07-31
Payer: COMMERCIAL

## 2019-07-31 ENCOUNTER — LAB (OUTPATIENT)
Dept: LAB | Facility: CLINIC | Age: 59
End: 2019-07-31
Payer: COMMERCIAL

## 2019-07-31 VITALS
WEIGHT: 161.8 LBS | SYSTOLIC BLOOD PRESSURE: 128 MMHG | BODY MASS INDEX: 26.96 KG/M2 | HEIGHT: 65 IN | HEART RATE: 80 BPM | DIASTOLIC BLOOD PRESSURE: 82 MMHG

## 2019-07-31 DIAGNOSIS — E55.9 VITAMIN D DEFICIENCY: ICD-10-CM

## 2019-07-31 DIAGNOSIS — M81.0 AGE-RELATED OSTEOPOROSIS WITHOUT CURRENT PATHOLOGICAL FRACTURE: Primary | ICD-10-CM

## 2019-07-31 DIAGNOSIS — R53.82 CHRONIC FATIGUE, UNSPECIFIED: ICD-10-CM

## 2019-07-31 DIAGNOSIS — M81.0 AGE-RELATED OSTEOPOROSIS WITHOUT CURRENT PATHOLOGICAL FRACTURE: ICD-10-CM

## 2019-07-31 LAB
T4 FREE SERPL-MCNC: 0.99 NG/DL (ref 0.76–1.46)
TSH SERPL DL<=0.05 MIU/L-ACNC: 3.44 UIU/ML (ref 0.36–3.74)

## 2019-07-31 PROCEDURE — 99244 OFF/OP CNSLTJ NEW/EST MOD 40: CPT | Performed by: INTERNAL MEDICINE

## 2019-07-31 PROCEDURE — 84165 PROTEIN E-PHORESIS SERUM: CPT

## 2019-07-31 PROCEDURE — 84439 ASSAY OF FREE THYROXINE: CPT

## 2019-07-31 PROCEDURE — 84166 PROTEIN E-PHORESIS/URINE/CSF: CPT

## 2019-07-31 PROCEDURE — 84166 PROTEIN E-PHORESIS/URINE/CSF: CPT | Performed by: PATHOLOGY

## 2019-07-31 PROCEDURE — 84165 PROTEIN E-PHORESIS SERUM: CPT | Performed by: PATHOLOGY

## 2019-07-31 PROCEDURE — 84443 ASSAY THYROID STIM HORMONE: CPT

## 2019-07-31 PROCEDURE — 36415 COLL VENOUS BLD VENIPUNCTURE: CPT

## 2019-07-31 RX ORDER — MULTIVITAMIN WITH IRON
100 TABLET ORAL DAILY
COMMUNITY
End: 2022-01-31 | Stop reason: ALTCHOICE

## 2019-07-31 NOTE — PROGRESS NOTES
Inna Llamas 60 y o  female MRN: [id number]    Encounter: [id number]      Assessment/Plan     Assessment: This is a 60y o -year-old female with vitamin D deficiency and osteoporosis  Plan:  1  For the osteoporosis, I have ordered a TSH, free T4, SPEP and UPEP to complete the metabolic workup  Since her risk of fracture is below treatment threshold, we have decided not to began pharmacologic therapy  We did go over dietary as well as lifestyle modifications that would help her osteoporosis which included obtaining 1200 milligrams of calcium through the diet per day, strengthening of the quadriceps and daily weight-bearing exercise  I will plan to see her again if her bone density worsens  2  Vitamin-D deficiency-continue supplementation  CC:   Osteoporosis    History of Present Illness     HPI:  50-year-old woman with osteoporosis on her most recent DEXA scan  She denies any history of fractures  She does not drink milk, occasionally eats cheese yogurt in ice cream   She does drink carbonated beverages  She does not smoke and show show a drinks alcohol  Her mother and grandmother had osteoporosis  She is currently not on any osteoporosis medications  Review of Systems   Constitutional: Negative for chills and fever  Respiratory: Negative for shortness of breath  Cardiovascular: Negative for chest pain  Gastrointestinal: Negative for constipation, diarrhea, nausea and vomiting  All other systems reviewed and are negative  Historical Information   History reviewed  No pertinent past medical history  History reviewed  No pertinent surgical history    Social History   Social History     Substance and Sexual Activity   Alcohol Use Yes    Comment: social     Social History     Substance and Sexual Activity   Drug Use Not on file     Social History     Tobacco Use   Smoking Status Never Smoker   Smokeless Tobacco Never Used     Family History:   Family History   Problem Relation Age of Onset    Heart attack Mother     Arthritis Mother     Diabetes Mother     Hypertension Mother     Hypothyroidism Mother     Aneurysm Father         Abd Aorta    Heart disease Father         CABG    Anxiety disorder Paternal Grandmother     No Known Problems Maternal Grandmother     No Known Problems Maternal Grandfather     No Known Problems Paternal Grandfather     Lung cancer Paternal Aunt        Meds/Allergies   Current Outpatient Medications   Medication Sig Dispense Refill    ALPRAZolam (XANAX) 0 5 mg tablet Take 1 tablet (0 5 mg total) by mouth 2 (two) times a day as needed for anxiety for up to 30 days 30 tablet 0    aspirin 81 MG tablet Take 1 tablet by mouth daily      atorvastatin (LIPITOR) 40 mg tablet Take 1 tablet (40 mg total) by mouth daily 90 tablet 3    Cholecalciferol (CVS VITAMIN D) 2000 units CAPS Take 1 capsule by mouth daily      FLUoxetine (PROzac) 40 MG capsule Take 1 capsule (40 mg total) by mouth daily 90 capsule 3    Multiple Vitamin (DAILY VALUE MULTIVITAMIN) TABS Take 1 tablet by mouth daily      Omega-3 Fatty Acids (FISH OIL) 600 MG CAPS Take by mouth      pyridoxine (VITAMIN B6) 100 mg tablet Take 100 mg by mouth daily      vitamin E, tocopherol, 400 units capsule Take by mouth       No current facility-administered medications for this visit  No Known Allergies    Objective   Vitals: Blood pressure 128/82, pulse 80, height 5' 5" (1 651 m), weight 73 4 kg (161 lb 12 8 oz)  Physical Exam   Constitutional: She is oriented to person, place, and time  She appears well-developed and well-nourished  No distress  HENT:   Head: Normocephalic and atraumatic  Mouth/Throat: Oropharynx is clear and moist and mucous membranes are normal  No oropharyngeal exudate  Eyes: Conjunctivae, EOM and lids are normal  Right eye exhibits no discharge  Left eye exhibits no discharge  No scleral icterus  Neck: Neck supple  No thyromegaly present     Cardiovascular: Normal rate, regular rhythm and normal heart sounds  Exam reveals no gallop and no friction rub  No murmur heard  Pulmonary/Chest: Effort normal and breath sounds normal  No respiratory distress  She has no wheezes  Abdominal: Soft  Bowel sounds are normal  She exhibits no distension  There is no tenderness  Musculoskeletal: Normal range of motion  She exhibits no edema, tenderness or deformity  Lymphadenopathy:        Head (right side): No occipital adenopathy present  Head (left side): No occipital adenopathy present  Right: No supraclavicular adenopathy present  Left: No supraclavicular adenopathy present  Neurological: She is alert and oriented to person, place, and time  No cranial nerve deficit  Skin: Skin is warm and intact  No rash noted  She is not diaphoretic  No erythema  Psychiatric: She has a normal mood and affect  Vitals reviewed  The history was obtained from the review of the chart, patient and family      Lab Results:   Lab Results   Component Value Date/Time    Potassium 3 7 07/25/2019 11:47 AM    Potassium 4 2 04/10/2019 12:31 PM    Potassium 3 7 08/31/2018 10:42 AM    Chloride 104 07/25/2019 11:47 AM    Chloride 107 04/10/2019 12:31 PM    Chloride 103 08/31/2018 10:42 AM    CO2 28 07/25/2019 11:47 AM    CO2 29 04/10/2019 12:31 PM    CO2 29 08/31/2018 10:42 AM    BUN 14 07/25/2019 11:47 AM    BUN 13 04/10/2019 12:31 PM    BUN 14 08/31/2018 10:42 AM    Creatinine 0 77 07/25/2019 11:47 AM    Creatinine 0 78 04/10/2019 12:31 PM    Creatinine 0 77 08/31/2018 10:42 AM    Glucose, Fasting 80 07/25/2019 11:47 AM    Glucose, Fasting 87 04/10/2019 12:31 PM    Glucose, Fasting 83 08/31/2018 10:42 AM    Calcium 8 9 07/25/2019 11:47 AM    Calcium 9 2 04/10/2019 12:31 PM    Calcium 9 0 08/31/2018 10:42 AM    eGFR 84 07/25/2019 11:47 AM    eGFR 83 04/10/2019 12:31 PM    eGFR 85 08/31/2018 10:42 AM    Vit D, 25-Hydroxy 45 1 04/10/2019 12:31 PM    Vit D, 25-Hydroxy 37 3 08/31/2018 10:42 AM         Imaging Studies:            Results for orders placed during the hospital encounter of 05/16/19   DXA bone density spine hip and pelvis    Impression 1  Based on the [facility] classification, this study identifies a diagnosis of osteoporosis, most notable at the spine area and the patient is considered at increased risk for fracture  2  A daily intake of calcium of at least 1200 mg and vitamin D, 800-1000 IU, as well as weight bearing and muscle strengthening exercise, fall prevention and avoidance of tobacco and excessive alcohol intake as basic preventive measures are recommended  3  Repeat DXA scan on the same equipment in 18-24 months as clinically indicated  The 10 year risk of hip fracture is 0 1%, with the 10 year risk of major osteoporotic fracture being 13%, as calculated by the [facility] fracture risk assessment tool (FRAX)  The current NOF guidelines recommend treating patients with FRAX 10 year risk score   of >3% for hip fracture and >20% for major osteoporotic fracture  WHO CLASSIFICATION:  Normal (a T-score of -1 0 or higher)  Low bone mineral density (a T-score of less than -1 0 but higher than -2 5)  Osteoporosis (a T-score of -2 5 or less)  Severe osteoporosis (a T-score of -2 5 or less with a fragility fracture)    Thank you for allowing us the opportunity to participate in your patient care  The expanded DEXA report will no longer be arriving in your mail  If you desire to view the full report please contact [address] [place] [address] or access the PACS system  Workstation performed: [id number]                  I have personally reviewed pertinent reports  Portions of the record may have been created with voice recognition software  Occasional wrong word or "sound a like" substitutions may have occurred due to the inherent limitations of voice recognition software   Read the chart carefully and recognize, using context, where substitutions have occurred

## 2019-08-02 ENCOUNTER — TELEPHONE (OUTPATIENT)
Dept: ENDOCRINOLOGY | Facility: CLINIC | Age: 59
End: 2019-08-02

## 2019-08-02 LAB
ALBUMIN SERPL ELPH-MCNC: 4.71 G/DL (ref 3.5–5)
ALBUMIN SERPL ELPH-MCNC: 61.2 % (ref 52–65)
ALBUMIN UR ELPH-MCNC: 100 %
ALPHA1 GLOB MFR UR ELPH: 0 %
ALPHA1 GLOB SERPL ELPH-MCNC: 0.3 G/DL (ref 0.1–0.4)
ALPHA1 GLOB SERPL ELPH-MCNC: 3.9 % (ref 2.5–5)
ALPHA2 GLOB MFR UR ELPH: 0 %
ALPHA2 GLOB SERPL ELPH-MCNC: 0.83 G/DL (ref 0.4–1.2)
ALPHA2 GLOB SERPL ELPH-MCNC: 10.8 % (ref 7–13)
B-GLOBULIN MFR UR ELPH: 0 %
BETA GLOB ABNORMAL SERPL ELPH-MCNC: 0.42 G/DL (ref 0.4–0.8)
BETA1 GLOB SERPL ELPH-MCNC: 5.4 % (ref 5–13)
BETA2 GLOB SERPL ELPH-MCNC: 4.7 % (ref 2–8)
BETA2+GAMMA GLOB SERPL ELPH-MCNC: 0.36 G/DL (ref 0.2–0.5)
GAMMA GLOB ABNORMAL SERPL ELPH-MCNC: 1.08 G/DL (ref 0.5–1.6)
GAMMA GLOB MFR UR ELPH: 0 %
GAMMA GLOB SERPL ELPH-MCNC: 14 % (ref 12–22)
IGG/ALB SER: 1.58 {RATIO} (ref 1.1–1.8)
PROT PATTERN SERPL ELPH-IMP: NORMAL
PROT PATTERN UR ELPH-IMP: NORMAL
PROT SERPL-MCNC: 7.7 G/DL (ref 6.4–8.2)
PROT UR-MCNC: 13 MG/DL

## 2019-08-02 NOTE — TELEPHONE ENCOUNTER
----- Message from Lalo Comer MD sent at 8/2/2019  8:04 AM EDT -----  The laboratory testing results are normal

## 2019-10-16 ENCOUNTER — APPOINTMENT (OUTPATIENT)
Dept: LAB | Facility: CLINIC | Age: 59
End: 2019-10-16
Payer: COMMERCIAL

## 2019-10-16 DIAGNOSIS — E78.2 MIXED HYPERLIPIDEMIA: ICD-10-CM

## 2019-10-16 LAB
ALBUMIN SERPL BCP-MCNC: 4.2 G/DL (ref 3.5–5)
ALP SERPL-CCNC: 97 U/L (ref 46–116)
ALT SERPL W P-5'-P-CCNC: 86 U/L (ref 12–78)
ANION GAP SERPL CALCULATED.3IONS-SCNC: 8 MMOL/L (ref 4–13)
AST SERPL W P-5'-P-CCNC: 32 U/L (ref 5–45)
BILIRUB SERPL-MCNC: 0.55 MG/DL (ref 0.2–1)
BUN SERPL-MCNC: 17 MG/DL (ref 5–25)
CALCIUM SERPL-MCNC: 9.4 MG/DL (ref 8.3–10.1)
CHLORIDE SERPL-SCNC: 107 MMOL/L (ref 100–108)
CHOLEST SERPL-MCNC: 221 MG/DL (ref 50–200)
CO2 SERPL-SCNC: 26 MMOL/L (ref 21–32)
CREAT SERPL-MCNC: 0.75 MG/DL (ref 0.6–1.3)
GFR SERPL CREATININE-BSD FRML MDRD: 87 ML/MIN/1.73SQ M
GLUCOSE P FAST SERPL-MCNC: 87 MG/DL (ref 65–99)
HDLC SERPL-MCNC: 63 MG/DL (ref 40–60)
LDLC SERPL CALC-MCNC: 131 MG/DL (ref 0–100)
POTASSIUM SERPL-SCNC: 3.3 MMOL/L (ref 3.5–5.3)
PROT SERPL-MCNC: 7.7 G/DL (ref 6.4–8.2)
SODIUM SERPL-SCNC: 141 MMOL/L (ref 136–145)
TRIGL SERPL-MCNC: 136 MG/DL

## 2019-10-16 PROCEDURE — 36415 COLL VENOUS BLD VENIPUNCTURE: CPT

## 2019-10-16 PROCEDURE — 80053 COMPREHEN METABOLIC PANEL: CPT

## 2019-10-16 PROCEDURE — 80061 LIPID PANEL: CPT

## 2019-10-23 ENCOUNTER — OFFICE VISIT (OUTPATIENT)
Dept: FAMILY MEDICINE CLINIC | Facility: CLINIC | Age: 59
End: 2019-10-23
Payer: COMMERCIAL

## 2019-10-23 VITALS
BODY MASS INDEX: 27.32 KG/M2 | TEMPERATURE: 100.1 F | DIASTOLIC BLOOD PRESSURE: 78 MMHG | WEIGHT: 164 LBS | HEART RATE: 96 BPM | RESPIRATION RATE: 16 BRPM | HEIGHT: 65 IN | SYSTOLIC BLOOD PRESSURE: 136 MMHG

## 2019-10-23 DIAGNOSIS — M81.0 AGE-RELATED OSTEOPOROSIS WITHOUT CURRENT PATHOLOGICAL FRACTURE: Primary | ICD-10-CM

## 2019-10-23 DIAGNOSIS — F41.9 ANXIETY: ICD-10-CM

## 2019-10-23 DIAGNOSIS — E55.9 VITAMIN D DEFICIENCY: ICD-10-CM

## 2019-10-23 DIAGNOSIS — F51.01 PRIMARY INSOMNIA: ICD-10-CM

## 2019-10-23 DIAGNOSIS — E78.2 MIXED HYPERLIPIDEMIA: ICD-10-CM

## 2019-10-23 DIAGNOSIS — Z12.11 SCREENING FOR COLON CANCER: ICD-10-CM

## 2019-10-23 PROCEDURE — 3008F BODY MASS INDEX DOCD: CPT | Performed by: PHYSICIAN ASSISTANT

## 2019-10-23 PROCEDURE — 99214 OFFICE O/P EST MOD 30 MIN: CPT | Performed by: PHYSICIAN ASSISTANT

## 2019-10-23 NOTE — PATIENT INSTRUCTIONS
Problem List Items Addressed This Visit        Musculoskeletal and Integument    Age-related osteoporosis without current pathological fracture - Primary      Patient was seen by Endocrinology Dr Varghese and further blood work was done including protein electrophoresis both serum and urine which was normal   Consensus was that patient even though her T-score was -2 9 osteoporosis because of her low fracture risk no pharmacotherapy was started  Therefore I will make sure patient gets a repeat DEXA 2 years from her last which was May of this year and therefore next do would be May of 2021  Relevant Orders    DXA bone density spine hip and pelvis       Other    Anxiety    Mixed hyperlipidemia       LDL has gone from 119-131 however triglycerides stable  Continue Lipitor 40 mg recheck 6 months  Relevant Orders    Lipid Panel with Direct LDL reflex    Comprehensive metabolic panel    Vitamin D deficiency      Check with next labs  Relevant Orders    Vitamin D 25 hydroxy    Primary insomnia     Trial of 1-2 benadryl at bedtime              Other Visit Diagnoses     Screening for colon cancer        Relevant Orders    Occult Blood, Fecal Immunochemical

## 2019-10-23 NOTE — PROGRESS NOTES
Assessment and Plan:    Problem List Items Addressed This Visit        Musculoskeletal and Integument    Age-related osteoporosis without current pathological fracture - Primary      Patient was seen by Endocrinology Dr Varghese and further blood work was done including protein electrophoresis both serum and urine which was normal   Consensus was that patient even though her T-score was -2 9 osteoporosis because of her low fracture risk no pharmacotherapy was started  Therefore I will make sure patient gets a repeat DEXA 2 years from her last which was May of this year and therefore next do would be May of 2021  Relevant Orders    DXA bone density spine hip and pelvis       Other    Anxiety    Mixed hyperlipidemia       LDL has gone from 119-131 however triglycerides stable  Continue Lipitor 40 mg recheck 6 months  Relevant Orders    Lipid Panel with Direct LDL reflex    Comprehensive metabolic panel    Vitamin D deficiency      Check with next labs  Relevant Orders    Vitamin D 25 hydroxy    Primary insomnia     Trial of 1-2 benadryl at bedtime  Other Visit Diagnoses     Screening for colon cancer        Relevant Orders    Occult Blood, Fecal Immunochemical                 Diagnoses and all orders for this visit:    Age-related osteoporosis without current pathological fracture  -     Cancel: DXA bone density spine hip and pelvis; Future  -     DXA bone density spine hip and pelvis; Future    Mixed hyperlipidemia  -     Lipid Panel with Direct LDL reflex; Future  -     Comprehensive metabolic panel; Future    Anxiety    Vitamin D deficiency  -     Vitamin D 25 hydroxy; Future    Screening for colon cancer  -     Occult Blood, Fecal Immunochemical; Future    Primary insomnia              Subjective:      Patient ID: Liliana Fortune is a 60 y o  female      CC:    Chief Complaint   Patient presents with    Follow-up     Patient present today for her 6 month follow up to review her blood work results  HPI:      Xavier Mueller is here for chronic conditions f/u including the diagnosis of Age-related osteoporosis without current pathological fracture  (primary encounter diagnosis)  Mixed hyperlipidemia  Anxiety  Vitamin d deficiency  Screening for colon cancer   Pt  states they are taking all medications as directed without complaints or side effects   Pt  had labs done prior to today's visit which included Recent Results (from the past 672 hour(s))  -Lipid Panel with Direct LDL reflex  Collection Time: 10/16/19  8:23 AM       Result                      Value             Ref Range           Cholesterol                 221 (H)           50 - 200 mg/*       Triglycerides               136               <=150 mg/dL         HDL, Direct                 63 (H)            40 - 60 mg/dL       LDL Calculated              131 (H)           0 - 100 mg/dL  -Comprehensive metabolic panel  Collection Time: 10/16/19  8:23 AM       Result                      Value             Ref Range           Sodium                      141               136 - 145 mm*       Potassium                   3 3 (L)           3 5 - 5 3 mm*       Chloride                    107               100 - 108 mm*       CO2                         26                21 - 32 mmol*       ANION GAP                   8                 4 - 13 mmol/L       BUN                         17                5 - 25 mg/dL        Creatinine                  0 75              0 60 - 1 30 *       Glucose, Fasting            87                65 - 99 mg/dL       Calcium                     9 4               8 3 - 10 1 m*       AST                         32                5 - 45 U/L          ALT                         86 (H)            12 - 78 U/L         Alkaline Phosphatase        97                46 - 116 U/L        Total Protein               7 7               6 4 - 8 2 g/*       Albumin                     4 2               3 5 - 5 0 g/*       Total Bilirubin             0 55              0 20 - 1 00 *       eGFR                        87                ml/min/1 73s*        Patient states that she has trouble sleeping and she usually is up for hours doing work  She states that she goes to bed very late in her  goes but early  She likes a fall asleep with noise and he likes a full sleep with silence so they usually end up sleeping in different times  She states that she always does feel anxious and has been on the Prozac 40 mg for a long time  She does have Xanax but uses it infrequently  She has never tried to take anything for sleeping in the past   She did see Endocrinology and they said that she did not need treatment for osteoporosis even though her T-score was -2 9 in the osteoporosis range  They did additional testing which was negative but did not order repeat DEXA  I did this today  The following portions of the patient's history were reviewed and updated as appropriate: allergies, current medications, past family history, past medical history, past social history, past surgical history and problem list       Review of Systems   Constitutional: Negative  HENT: Negative  Eyes: Negative  Respiratory: Negative  Cardiovascular: Negative  Gastrointestinal: Negative  Endocrine: Negative  Genitourinary: Negative  Musculoskeletal: Negative  Skin: Negative  Allergic/Immunologic: Negative  Neurological: Negative  Hematological: Negative  Psychiatric/Behavioral: Negative  Data to review:       Objective:    Vitals:    10/23/19 1328   BP: 136/78   BP Location: Left arm   Patient Position: Sitting   Cuff Size: Standard   Pulse: 96   Resp: 16   Temp: 100 1 °F (37 8 °C)   TempSrc: Temporal   Weight: 74 4 kg (164 lb)   Height: 5' 5" (1 651 m)        Physical Exam   Constitutional: She is oriented to person, place, and time  She appears well-developed and well-nourished  No distress     HENT:   Head: Normocephalic and atraumatic  Eyes: Conjunctivae are normal  Right eye exhibits no discharge  Left eye exhibits no discharge  Neck: Neck supple  Carotid bruit is not present  Cardiovascular: Normal rate, regular rhythm and normal heart sounds  Exam reveals no gallop and no friction rub  No murmur heard  Pulmonary/Chest: Effort normal and breath sounds normal  No respiratory distress  She has no wheezes  She has no rales  Neurological: She is alert and oriented to person, place, and time  Skin: Skin is warm and dry  She is not diaphoretic  Psychiatric: She has a normal mood and affect  Judgment normal    Nursing note and vitals reviewed

## 2019-10-23 NOTE — ASSESSMENT & PLAN NOTE
Patient was seen by Endocrinology Dr Varghese and further blood work was done including protein electrophoresis both serum and urine which was normal   Consensus was that patient even though her T-score was -2 9 osteoporosis because of her low fracture risk no pharmacotherapy was started  Therefore I will make sure patient gets a repeat DEXA 2 years from her last which was May of this year and therefore next do would be May of 2021

## 2020-05-04 ENCOUNTER — APPOINTMENT (OUTPATIENT)
Dept: LAB | Facility: MEDICAL CENTER | Age: 60
End: 2020-05-04
Payer: COMMERCIAL

## 2020-05-04 DIAGNOSIS — E78.2 MIXED HYPERLIPIDEMIA: ICD-10-CM

## 2020-05-04 DIAGNOSIS — E55.9 VITAMIN D DEFICIENCY: ICD-10-CM

## 2020-05-04 LAB
25(OH)D3 SERPL-MCNC: 38.3 NG/ML (ref 30–100)
ALBUMIN SERPL BCP-MCNC: 3.8 G/DL (ref 3.5–5)
ALP SERPL-CCNC: 114 U/L (ref 46–116)
ALT SERPL W P-5'-P-CCNC: 82 U/L (ref 12–78)
ANION GAP SERPL CALCULATED.3IONS-SCNC: 4 MMOL/L (ref 4–13)
AST SERPL W P-5'-P-CCNC: 39 U/L (ref 5–45)
BILIRUB SERPL-MCNC: 0.56 MG/DL (ref 0.2–1)
BUN SERPL-MCNC: 16 MG/DL (ref 5–25)
CALCIUM SERPL-MCNC: 9 MG/DL (ref 8.3–10.1)
CHLORIDE SERPL-SCNC: 110 MMOL/L (ref 100–108)
CHOLEST SERPL-MCNC: 208 MG/DL (ref 50–200)
CO2 SERPL-SCNC: 27 MMOL/L (ref 21–32)
CREAT SERPL-MCNC: 0.74 MG/DL (ref 0.6–1.3)
GFR SERPL CREATININE-BSD FRML MDRD: 88 ML/MIN/1.73SQ M
GLUCOSE P FAST SERPL-MCNC: 83 MG/DL (ref 65–99)
HDLC SERPL-MCNC: 55 MG/DL
LDLC SERPL CALC-MCNC: 127 MG/DL (ref 0–100)
POTASSIUM SERPL-SCNC: 3.7 MMOL/L (ref 3.5–5.3)
PROT SERPL-MCNC: 7.1 G/DL (ref 6.4–8.2)
SODIUM SERPL-SCNC: 141 MMOL/L (ref 136–145)
TRIGL SERPL-MCNC: 128 MG/DL

## 2020-05-04 PROCEDURE — 80061 LIPID PANEL: CPT

## 2020-05-04 PROCEDURE — 80053 COMPREHEN METABOLIC PANEL: CPT

## 2020-05-04 PROCEDURE — 36415 COLL VENOUS BLD VENIPUNCTURE: CPT

## 2020-05-04 PROCEDURE — 82306 VITAMIN D 25 HYDROXY: CPT

## 2020-05-05 ENCOUNTER — OFFICE VISIT (OUTPATIENT)
Dept: FAMILY MEDICINE CLINIC | Facility: CLINIC | Age: 60
End: 2020-05-05
Payer: COMMERCIAL

## 2020-05-05 VITALS
DIASTOLIC BLOOD PRESSURE: 62 MMHG | WEIGHT: 166.8 LBS | HEART RATE: 68 BPM | SYSTOLIC BLOOD PRESSURE: 108 MMHG | BODY MASS INDEX: 27.79 KG/M2 | HEIGHT: 65 IN

## 2020-05-05 DIAGNOSIS — F51.01 PRIMARY INSOMNIA: ICD-10-CM

## 2020-05-05 DIAGNOSIS — E55.9 VITAMIN D DEFICIENCY: ICD-10-CM

## 2020-05-05 DIAGNOSIS — Z12.39 BREAST CANCER SCREENING: ICD-10-CM

## 2020-05-05 DIAGNOSIS — F41.9 ANXIETY: ICD-10-CM

## 2020-05-05 DIAGNOSIS — E78.2 MIXED HYPERLIPIDEMIA: Primary | ICD-10-CM

## 2020-05-05 DIAGNOSIS — Z11.59 NEED FOR HEPATITIS C SCREENING TEST: ICD-10-CM

## 2020-05-05 DIAGNOSIS — Z12.11 COLON CANCER SCREENING: ICD-10-CM

## 2020-05-05 DIAGNOSIS — R79.89 ELEVATED LFTS: ICD-10-CM

## 2020-05-05 PROCEDURE — 99214 OFFICE O/P EST MOD 30 MIN: CPT | Performed by: PHYSICIAN ASSISTANT

## 2020-05-05 PROCEDURE — 1036F TOBACCO NON-USER: CPT | Performed by: PHYSICIAN ASSISTANT

## 2020-05-05 PROCEDURE — 3008F BODY MASS INDEX DOCD: CPT | Performed by: PHYSICIAN ASSISTANT

## 2020-05-05 RX ORDER — TRAZODONE HYDROCHLORIDE 50 MG/1
50 TABLET ORAL
Qty: 30 TABLET | Refills: 5 | Status: SHIPPED | OUTPATIENT
Start: 2020-05-05 | End: 2021-06-01 | Stop reason: SDUPTHER

## 2020-05-05 RX ORDER — ALPRAZOLAM 0.5 MG/1
0.5 TABLET ORAL 2 TIMES DAILY PRN
Qty: 30 TABLET | Refills: 0 | Status: SHIPPED | OUTPATIENT
Start: 2020-05-05 | End: 2021-04-18 | Stop reason: SDUPTHER

## 2020-06-09 DIAGNOSIS — E78.2 MIXED HYPERLIPIDEMIA: ICD-10-CM

## 2020-06-09 DIAGNOSIS — F32.89 OTHER DEPRESSION: ICD-10-CM

## 2020-06-09 RX ORDER — ATORVASTATIN CALCIUM 40 MG/1
TABLET, FILM COATED ORAL
Qty: 90 TABLET | Refills: 3 | Status: SHIPPED | OUTPATIENT
Start: 2020-06-09 | End: 2021-06-01 | Stop reason: SDUPTHER

## 2020-06-09 RX ORDER — FLUOXETINE HYDROCHLORIDE 40 MG/1
CAPSULE ORAL
Qty: 90 CAPSULE | Refills: 3 | Status: SHIPPED | OUTPATIENT
Start: 2020-06-09 | End: 2021-06-01 | Stop reason: SDUPTHER

## 2020-08-10 ENCOUNTER — TELEPHONE (OUTPATIENT)
Dept: URGENT CARE | Facility: MEDICAL CENTER | Age: 60
End: 2020-08-10

## 2020-08-10 ENCOUNTER — APPOINTMENT (OUTPATIENT)
Dept: RADIOLOGY | Facility: MEDICAL CENTER | Age: 60
End: 2020-08-10
Payer: COMMERCIAL

## 2020-08-10 ENCOUNTER — OFFICE VISIT (OUTPATIENT)
Dept: URGENT CARE | Facility: MEDICAL CENTER | Age: 60
End: 2020-08-10
Payer: COMMERCIAL

## 2020-08-10 VITALS
BODY MASS INDEX: 25.83 KG/M2 | SYSTOLIC BLOOD PRESSURE: 175 MMHG | HEART RATE: 78 BPM | HEIGHT: 65 IN | DIASTOLIC BLOOD PRESSURE: 84 MMHG | RESPIRATION RATE: 18 BRPM | OXYGEN SATURATION: 96 % | TEMPERATURE: 98.4 F | WEIGHT: 155 LBS

## 2020-08-10 DIAGNOSIS — M54.50 ACUTE LOW BACK PAIN, UNSPECIFIED BACK PAIN LATERALITY, UNSPECIFIED WHETHER SCIATICA PRESENT: Primary | ICD-10-CM

## 2020-08-10 DIAGNOSIS — M54.50 ACUTE LOW BACK PAIN, UNSPECIFIED BACK PAIN LATERALITY, UNSPECIFIED WHETHER SCIATICA PRESENT: ICD-10-CM

## 2020-08-10 PROCEDURE — 72100 X-RAY EXAM L-S SPINE 2/3 VWS: CPT

## 2020-08-10 PROCEDURE — 99213 OFFICE O/P EST LOW 20 MIN: CPT | Performed by: FAMILY MEDICINE

## 2020-08-10 RX ORDER — METHOCARBAMOL 500 MG/1
500 TABLET, FILM COATED ORAL
Qty: 20 TABLET | Refills: 0 | Status: SHIPPED | OUTPATIENT
Start: 2020-08-10 | End: 2021-06-01 | Stop reason: ALTCHOICE

## 2020-08-10 NOTE — PROGRESS NOTES
[address]        NAME: Raghu Le is a 63 y o  female  : 1960    MRN: [id number]  DATE: August 10, 2020  TIME: 2:05 PM    Assessment and Plan   Acute low back pain, unspecified back pain laterality, unspecified whether sciatica present [M54 5]  1  Acute low back pain, unspecified back pain laterality, unspecified whether sciatica present  XR spine lumbar 2 or 3 views injury    methocarbamol (ROBAXIN) 500 mg tablet         Patient Instructions       Follow up with PCP in 3-5 days  Proceed to  ER if symptoms worsen  Chief Complaint     Chief Complaint   Patient presents with    Back Pain     Patient states she fell last Tuesday, he was getting up and her feet were wet and she fell backwards onto her back, she states she hit her head on the handle of the stove  Patient denies any head injury but has lower back pain and pain in her sides when she turns         History of Present Illness       69-year-old female here today with low back pain for the last 6 days  Patient informs me that while at home, she walked into her kitchen with wet slippers and slid hitting the back of her head and lower back pain  Denies any loss of consciousness  Pain is predominant located in the lumbar area  Seems to be localized, constant  Denies any numbness or weakness of the lower extremity  She has been applying ice but has recently switched he which seems to offer better relief  Currently taking Bufferin every 6 hours with moderate improvement  Pain is worse when she is sitting for prolonged present time or bending for feels better when she lies supine  Denies any previous back injury  Her past medical history significant for osteoporosis  Denies history of fracture in the past       Review of Systems   Review of Systems   Musculoskeletal: Positive for back pain  Neurological: Negative            Current Medications       Current Outpatient Medications:     aspirin 81 MG tablet, Take 1 tablet by mouth daily, Disp: , Rfl:     atorvastatin (LIPITOR) 40 mg tablet, TAKE 1 TABLET DAILY, Disp: 90 tablet, Rfl: 3    Cholecalciferol (CVS VITAMIN D) 2000 units CAPS, Take 1 capsule by mouth daily, Disp: , Rfl:     FLUoxetine (PROzac) 40 MG capsule, TAKE 1 CAPSULE DAILY, Disp: 90 capsule, Rfl: 3    Multiple Vitamin (DAILY VALUE MULTIVITAMIN) TABS, Take 1 tablet by mouth daily, Disp: , Rfl:     Omega-3 Fatty Acids (FISH OIL) 600 MG CAPS, Take by mouth, Disp: , Rfl:     pyridoxine (VITAMIN B6) 100 mg tablet, Take 100 mg by mouth daily, Disp: , Rfl:     vitamin E, tocopherol, 400 units capsule, Take by mouth, Disp: , Rfl:     ALPRAZolam (XANAX) 0 5 mg tablet, Take 1 tablet (0 5 mg total) by mouth 2 (two) times a day as needed for anxiety, Disp: 30 tablet, Rfl: 0    methocarbamol (ROBAXIN) 500 mg tablet, Take 1 tablet (500 mg total) by mouth daily at bedtime as needed for muscle spasms, Disp: 20 tablet, Rfl: 0    traZODone (DESYREL) 50 mg tablet, Take 1 tablet (50 mg total) by mouth daily at bedtime (Patient not taking: Reported on 8/10/2020), Disp: 30 tablet, Rfl: 5    Current Allergies     Allergies as of 08/10/2020 - Reviewed 08/10/2020   Allergen Reaction Noted    Augmentin [amoxicillin-pot clavulanate] Abdominal Pain 10/23/2019    Penicillins GI Intolerance 05/20/2015            The following portions of the patient's history were reviewed and updated as appropriate: allergies, current medications, past family history, past medical history, past social history, past surgical history and problem list      Past Medical History:   Diagnosis Date    Anxiety     Hypercholesteremia        History reviewed  No pertinent surgical history      Family History   Problem Relation Age of Onset    Heart attack Mother     Arthritis Mother     Diabetes Mother     Hypertension Mother     Hypothyroidism Mother     Aneurysm Father         Abd Aorta    Heart disease Father         CABG    Anxiety disorder Paternal Grandmother     No Known Problems Maternal Grandmother     No Known Problems Maternal Grandfather     No Known Problems Paternal Grandfather     Lung cancer Paternal Aunt          Medications have been verified  Objective   BP (!) 175/84   Pulse 78   Temp 98 4 °F (36 9 °C)   Resp 18   Ht 5' 5" (1 651 m)   Wt 70 3 kg (155 lb)   SpO2 96%   BMI 25 79 kg/m²        Physical Exam     Physical Exam  Musculoskeletal:         General: Tenderness present  Comments: LS spine:  Flexion to 20 degrees extension to 20 degrees lateral rotation side bending 30 degrees  There is significant tenderness over the sacral, right and left sacroiliac joint  Extremities:  Lower-good strength and tone, 5/5    Gait-normal

## 2020-08-10 NOTE — PATIENT INSTRUCTIONS
X-ray of lumbar spine reveals no fracture subluxation  There is mild loss of lumbar lordosis  I placed the patient empirically on a Robaxin 500 mg at bedtime p r n     I encouraged patient to continue heating pad as needed, back stretching activities  Follow-up with PCP if symptoms persist or worsen  Acute Low Back Pain, Ambulatory Care   GENERAL INFORMATION:   Acute low back pain  is discomfort in your lower back area that lasts for less than 12 weeks  The word acute is used to describe pain that starts suddenly, worsens quickly, and lasts for a short time  Common symptoms include the following:   · Back stiffness or spasms    · Pain down the back or side of one leg    · Holding yourself in an unusual position or posture to decrease your back pain    · Not being able to find a sitting position that is comfortable    · Slow increase in your pain for 24 to 48 hours after you stress your back    · Tenderness on your lower back or severe pain when you move your back  Seek immediate care for the following symptoms:   · Severe pain    · Sudden stiffness and heaviness in both buttocks down to both legs    · Numbness or weakness in one leg, or pain in both legs    · Numbness in your genital area or across your lower back    · Unable to control your urine or bowel movements  Treatment for acute low back pain  may include any of the following:  · Medicines:      ¨ NSAIDs  help decrease swelling and pain or fever  This medicine is available with or without a doctor's order  NSAIDs can cause stomach bleeding or kidney problems in certain people  If you take blood thinner medicine, always ask your healthcare provider if NSAIDs are safe for you  Always read the medicine label and follow directions  ¨ Muscle relaxers  help decrease muscle spasms pain  ¨ Prescription pain medicine  may be given  Ask how to take this medicine safely  · Surgery  may be needed if your pain is severe and other treatments do not work  Surgery may be needed for conditions of the lumbar spine, such as herniated disc or spinal stenosis  Manage your symptoms:   · Sleep on a firm mattress  If you do not have a firm mattress, have someone move your mattress to the floor for a few days  A piece of plywood under your mattress can also help make it firmer  · Apply ice  on your lower back for 15 to 20 minutes every hour or as directed  Use an ice pack, or put crushed ice in a plastic bag  Cover it with a towel  Ice helps prevent tissue damage and decreases swelling and pain  You can alternate ice and heat  · Apply heat  on your lower back for 20 to 30 minutes every 2 hours for as many days as directed  Heat helps decrease pain and muscle spasms  · Go to physical therapy  A physical therapist teaches you exercises to help improve movement and strength, and to decrease pain  Prevent acute low back pain:   · Use proper body mechanics  ¨ Bend at the hips and knees when you  objects  Do not bend from the waist  Use your leg muscles as you lift the load  Do not use your back  Keep the object close to your chest as you lift it  Try not to twist or lift anything above your waist     ¨ Change your position often when you stand for long periods of time  Rest one foot on a small box or footrest, and then switch to the other foot often  ¨ Try not to sit for long periods of time  When you do, sit in a straight-backed chair with your feet flat on the floor  Never reach, pull, or push while you are sitting  · Exercise regularly  Warm up before you exercise  Do exercises that strengthen your back muscles  Ask about the best exercise plan for you  · Maintain a healthy weight  Ask your healthcare provider how much you should weigh  Ask him to help you create a weight loss plan if you are overweight  Follow up with your healthcare provider as directed:  Return for a follow-up visit if you still have pain after 1 to 3 weeks of treatment  You may need to visit an orthopedist if your back pain lasts more than 6 to 12 weeks  Write down your questions so you remember to ask them during your visits  CARE AGREEMENT:   You have the right to help plan your care  Learn about your health condition and how it may be treated  Discuss treatment options with your caregivers to decide what care you want to receive  You always have the right to refuse treatment  The above information is an  only  It is not intended as medical advice for individual conditions or treatments  Talk to your doctor, nurse or pharmacist before following any medical regimen to see if it is safe and effective for you  © 2014 [address] is for End User's use only and may not be sold, redistributed or otherwise used for commercial purposes  All illustrations and images included in CareNotes® are the copyrighted property of A D A M , [employer]  or Nate Glasgow

## 2020-08-11 NOTE — TELEPHONE ENCOUNTER
Spoke with the patient regarding x-ray findings of lumbar spine reveals some compression deformity at several levels  Strongly advised patient to discuss finding with PCP  Patient expressed understanding

## 2020-11-05 ENCOUNTER — HOSPITAL ENCOUNTER (OUTPATIENT)
Dept: MAMMOGRAPHY | Facility: MEDICAL CENTER | Age: 60
Discharge: HOME | End: 2020-11-05
Payer: COMMERCIAL

## 2020-11-05 ENCOUNTER — APPOINTMENT (OUTPATIENT)
Dept: LAB | Facility: MEDICAL CENTER | Age: 60
End: 2020-11-05
Payer: COMMERCIAL

## 2020-11-05 VITALS — WEIGHT: 155 LBS | HEIGHT: 65 IN | BODY MASS INDEX: 25.83 KG/M2

## 2020-11-05 DIAGNOSIS — E78.2 MIXED HYPERLIPIDEMIA: ICD-10-CM

## 2020-11-05 DIAGNOSIS — Z12.39 BREAST CANCER SCREENING: ICD-10-CM

## 2020-11-05 DIAGNOSIS — Z11.59 NEED FOR HEPATITIS C SCREENING TEST: ICD-10-CM

## 2020-11-05 LAB
ALBUMIN SERPL BCP-MCNC: 4.1 G/DL (ref 3.5–5)
ALP SERPL-CCNC: 121 U/L (ref 46–116)
ALT SERPL W P-5'-P-CCNC: 48 U/L (ref 12–78)
ANION GAP SERPL CALCULATED.3IONS-SCNC: 5 MMOL/L (ref 4–13)
AST SERPL W P-5'-P-CCNC: 22 U/L (ref 5–45)
BILIRUB SERPL-MCNC: 0.63 MG/DL (ref 0.2–1)
BUN SERPL-MCNC: 18 MG/DL (ref 5–25)
CALCIUM SERPL-MCNC: 9.6 MG/DL (ref 8.3–10.1)
CHLORIDE SERPL-SCNC: 109 MMOL/L (ref 100–108)
CHOLEST SERPL-MCNC: 218 MG/DL (ref 50–200)
CO2 SERPL-SCNC: 27 MMOL/L (ref 21–32)
CREAT SERPL-MCNC: 0.79 MG/DL (ref 0.6–1.3)
GFR SERPL CREATININE-BSD FRML MDRD: 81 ML/MIN/1.73SQ M
GLUCOSE P FAST SERPL-MCNC: 83 MG/DL (ref 65–99)
HCV AB SER QL: NORMAL
HDLC SERPL-MCNC: 68 MG/DL
LDLC SERPL CALC-MCNC: 116 MG/DL (ref 0–100)
POTASSIUM SERPL-SCNC: 3.4 MMOL/L (ref 3.5–5.3)
PROT SERPL-MCNC: 7.9 G/DL (ref 6.4–8.2)
SODIUM SERPL-SCNC: 141 MMOL/L (ref 136–145)
TRIGL SERPL-MCNC: 168 MG/DL

## 2020-11-05 PROCEDURE — 77063 BREAST TOMOSYNTHESIS BI: CPT

## 2020-11-05 PROCEDURE — 80061 LIPID PANEL: CPT

## 2020-11-05 PROCEDURE — 80053 COMPREHEN METABOLIC PANEL: CPT

## 2020-11-05 PROCEDURE — 36415 COLL VENOUS BLD VENIPUNCTURE: CPT

## 2020-11-05 PROCEDURE — 86803 HEPATITIS C AB TEST: CPT

## 2020-11-05 PROCEDURE — 77067 SCR MAMMO BI INCL CAD: CPT

## 2020-11-16 PROBLEM — Z12.39 BREAST CANCER SCREENING: Status: RESOLVED | Noted: 2018-02-27 | Resolved: 2020-11-16

## 2020-11-17 ENCOUNTER — OFFICE VISIT (OUTPATIENT)
Dept: FAMILY MEDICINE CLINIC | Facility: CLINIC | Age: 60
End: 2020-11-17
Payer: COMMERCIAL

## 2020-11-17 VITALS
HEIGHT: 65 IN | BODY MASS INDEX: 28.16 KG/M2 | HEART RATE: 76 BPM | WEIGHT: 169 LBS | TEMPERATURE: 98.2 F | DIASTOLIC BLOOD PRESSURE: 66 MMHG | SYSTOLIC BLOOD PRESSURE: 110 MMHG

## 2020-11-17 DIAGNOSIS — F51.01 PRIMARY INSOMNIA: ICD-10-CM

## 2020-11-17 DIAGNOSIS — M81.0 AGE-RELATED OSTEOPOROSIS WITHOUT CURRENT PATHOLOGICAL FRACTURE: ICD-10-CM

## 2020-11-17 DIAGNOSIS — E78.2 MIXED HYPERLIPIDEMIA: ICD-10-CM

## 2020-11-17 DIAGNOSIS — E55.9 VITAMIN D DEFICIENCY: ICD-10-CM

## 2020-11-17 DIAGNOSIS — R79.89 ELEVATED LFTS: ICD-10-CM

## 2020-11-17 DIAGNOSIS — F41.9 ANXIETY: ICD-10-CM

## 2020-11-17 DIAGNOSIS — E78.00 HYPERCHOLESTEREMIA: ICD-10-CM

## 2020-11-17 PROCEDURE — 1036F TOBACCO NON-USER: CPT | Performed by: PHYSICIAN ASSISTANT

## 2020-11-17 PROCEDURE — 3008F BODY MASS INDEX DOCD: CPT | Performed by: PHYSICIAN ASSISTANT

## 2020-11-17 PROCEDURE — 99214 OFFICE O/P EST MOD 30 MIN: CPT | Performed by: PHYSICIAN ASSISTANT

## 2021-03-30 ENCOUNTER — IMMUNIZATIONS (OUTPATIENT)
Dept: FAMILY MEDICINE CLINIC | Facility: HOSPITAL | Age: 61
End: 2021-03-30

## 2021-03-30 DIAGNOSIS — Z23 ENCOUNTER FOR IMMUNIZATION: Primary | ICD-10-CM

## 2021-03-30 PROCEDURE — 91300 SARS-COV-2 / COVID-19 MRNA VACCINE (PFIZER-BIONTECH) 30 MCG: CPT

## 2021-03-30 PROCEDURE — 0001A SARS-COV-2 / COVID-19 MRNA VACCINE (PFIZER-BIONTECH) 30 MCG: CPT

## 2021-04-01 ENCOUNTER — VBI (OUTPATIENT)
Dept: ADMINISTRATIVE | Facility: OTHER | Age: 61
End: 2021-04-01

## 2021-04-18 DIAGNOSIS — F41.9 ANXIETY: ICD-10-CM

## 2021-04-19 RX ORDER — ALPRAZOLAM 0.5 MG/1
0.5 TABLET ORAL 2 TIMES DAILY PRN
Qty: 30 TABLET | Refills: 0 | Status: SHIPPED | OUTPATIENT
Start: 2021-04-19 | End: 2022-06-24 | Stop reason: SDUPTHER

## 2021-04-21 ENCOUNTER — IMMUNIZATIONS (OUTPATIENT)
Dept: FAMILY MEDICINE CLINIC | Facility: HOSPITAL | Age: 61
End: 2021-04-21

## 2021-04-21 DIAGNOSIS — Z23 ENCOUNTER FOR IMMUNIZATION: Primary | ICD-10-CM

## 2021-04-21 PROCEDURE — 0002A SARS-COV-2 / COVID-19 MRNA VACCINE (PFIZER-BIONTECH) 30 MCG: CPT

## 2021-04-21 PROCEDURE — 91300 SARS-COV-2 / COVID-19 MRNA VACCINE (PFIZER-BIONTECH) 30 MCG: CPT

## 2021-05-18 ENCOUNTER — APPOINTMENT (OUTPATIENT)
Dept: LAB | Facility: MEDICAL CENTER | Age: 61
End: 2021-05-18
Payer: COMMERCIAL

## 2021-05-18 DIAGNOSIS — E55.9 VITAMIN D DEFICIENCY: ICD-10-CM

## 2021-05-18 DIAGNOSIS — E78.2 MIXED HYPERLIPIDEMIA: ICD-10-CM

## 2021-05-18 LAB
25(OH)D3 SERPL-MCNC: 37.7 NG/ML (ref 30–100)
ALBUMIN SERPL BCP-MCNC: 3.8 G/DL (ref 3.5–5)
ALP SERPL-CCNC: 111 U/L (ref 46–116)
ALT SERPL W P-5'-P-CCNC: 64 U/L (ref 12–78)
ANION GAP SERPL CALCULATED.3IONS-SCNC: 5 MMOL/L (ref 4–13)
AST SERPL W P-5'-P-CCNC: 29 U/L (ref 5–45)
BILIRUB SERPL-MCNC: 0.57 MG/DL (ref 0.2–1)
BUN SERPL-MCNC: 18 MG/DL (ref 5–25)
CALCIUM SERPL-MCNC: 9.3 MG/DL (ref 8.3–10.1)
CHLORIDE SERPL-SCNC: 105 MMOL/L (ref 100–108)
CHOLEST SERPL-MCNC: 228 MG/DL (ref 50–200)
CO2 SERPL-SCNC: 28 MMOL/L (ref 21–32)
CREAT SERPL-MCNC: 0.75 MG/DL (ref 0.6–1.3)
GFR SERPL CREATININE-BSD FRML MDRD: 86 ML/MIN/1.73SQ M
GLUCOSE P FAST SERPL-MCNC: 89 MG/DL (ref 65–99)
HDLC SERPL-MCNC: 65 MG/DL
LDLC SERPL CALC-MCNC: 134 MG/DL (ref 0–100)
POTASSIUM SERPL-SCNC: 4 MMOL/L (ref 3.5–5.3)
PROT SERPL-MCNC: 7.3 G/DL (ref 6.4–8.2)
SODIUM SERPL-SCNC: 138 MMOL/L (ref 136–145)
TRIGL SERPL-MCNC: 145 MG/DL

## 2021-05-18 PROCEDURE — 80053 COMPREHEN METABOLIC PANEL: CPT

## 2021-05-18 PROCEDURE — 82306 VITAMIN D 25 HYDROXY: CPT

## 2021-05-18 PROCEDURE — 36415 COLL VENOUS BLD VENIPUNCTURE: CPT

## 2021-05-18 PROCEDURE — 80061 LIPID PANEL: CPT

## 2021-05-25 ENCOUNTER — RA CDI HCC (OUTPATIENT)
Dept: OTHER | Facility: HOSPITAL | Age: 61
End: 2021-05-25

## 2021-05-25 NOTE — PROGRESS NOTES
[facility]  coding opportunities          Chart reviewed, no opportunity found: CHART REVIEWED, NO OPPORTUNITY FOUND              Patients insurance company: [employer] (Medicare Advantage and Commercial)

## 2021-06-01 ENCOUNTER — OFFICE VISIT (OUTPATIENT)
Dept: FAMILY MEDICINE CLINIC | Facility: CLINIC | Age: 61
End: 2021-06-01
Payer: COMMERCIAL

## 2021-06-01 ENCOUNTER — TELEPHONE (OUTPATIENT)
Dept: ADMINISTRATIVE | Facility: OTHER | Age: 61
End: 2021-06-01

## 2021-06-01 VITALS
HEIGHT: 65 IN | SYSTOLIC BLOOD PRESSURE: 118 MMHG | RESPIRATION RATE: 16 BRPM | TEMPERATURE: 98.9 F | HEART RATE: 84 BPM | WEIGHT: 169 LBS | DIASTOLIC BLOOD PRESSURE: 78 MMHG | BODY MASS INDEX: 28.16 KG/M2

## 2021-06-01 DIAGNOSIS — F51.01 PRIMARY INSOMNIA: ICD-10-CM

## 2021-06-01 DIAGNOSIS — F32.89 OTHER DEPRESSION: ICD-10-CM

## 2021-06-01 DIAGNOSIS — E55.9 VITAMIN D DEFICIENCY: ICD-10-CM

## 2021-06-01 DIAGNOSIS — M81.0 AGE-RELATED OSTEOPOROSIS WITHOUT CURRENT PATHOLOGICAL FRACTURE: ICD-10-CM

## 2021-06-01 DIAGNOSIS — F41.9 ANXIETY: ICD-10-CM

## 2021-06-01 DIAGNOSIS — E78.2 MIXED HYPERLIPIDEMIA: Primary | ICD-10-CM

## 2021-06-01 PROBLEM — M54.50 ACUTE LOW BACK PAIN: Status: RESOLVED | Noted: 2020-08-10 | Resolved: 2021-06-01

## 2021-06-01 PROCEDURE — 3725F SCREEN DEPRESSION PERFORMED: CPT | Performed by: PHYSICIAN ASSISTANT

## 2021-06-01 PROCEDURE — 99214 OFFICE O/P EST MOD 30 MIN: CPT | Performed by: PHYSICIAN ASSISTANT

## 2021-06-01 PROCEDURE — 3008F BODY MASS INDEX DOCD: CPT | Performed by: PHYSICIAN ASSISTANT

## 2021-06-01 PROCEDURE — 1036F TOBACCO NON-USER: CPT | Performed by: PHYSICIAN ASSISTANT

## 2021-06-01 RX ORDER — TRAZODONE HYDROCHLORIDE 100 MG/1
100 TABLET ORAL
Qty: 30 TABLET | Refills: 11 | Status: SHIPPED | OUTPATIENT
Start: 2021-06-01 | End: 2021-12-17 | Stop reason: ALTCHOICE

## 2021-06-01 RX ORDER — FLUOXETINE HYDROCHLORIDE 40 MG/1
40 CAPSULE ORAL DAILY
Qty: 90 CAPSULE | Refills: 3 | Status: SHIPPED | OUTPATIENT
Start: 2021-06-01 | End: 2022-05-27

## 2021-06-01 RX ORDER — ATORVASTATIN CALCIUM 40 MG/1
40 TABLET, FILM COATED ORAL DAILY
Qty: 90 TABLET | Refills: 3 | Status: SHIPPED | OUTPATIENT
Start: 2021-06-01 | End: 2022-05-27

## 2021-06-01 NOTE — PROGRESS NOTES
Assessment and Plan:    Problem List Items Addressed This Visit        Musculoskeletal and Integument    Age-related osteoporosis without current pathological fracture     Dexa due  Order given  Call with results  Other    Anxiety     Stable on prozac 40 mg  Refills given  Mixed hyperlipidemia - Primary     Trigs improved from 168 to 134  LDL slightly higher from 116 to 134  No change and continue lipitor 40  Check in 6 months  Relevant Medications    atorvastatin (LIPITOR) 40 mg tablet    Other Relevant Orders    Lipid Panel with Direct LDL reflex    Comprehensive metabolic panel    Vitamin D deficiency     Good level at 37 7  Check yearly  Primary insomnia     Trazodone 50 mg w/o help  Will increase to 100 mg trial  Refill given  Relevant Medications    traZODone (DESYREL) 100 mg tablet      Other Visit Diagnoses     Other depression        Relevant Medications    FLUoxetine (PROzac) 40 MG capsule    traZODone (DESYREL) 100 mg tablet                 Diagnoses and all orders for this visit:    Mixed hyperlipidemia  -     Lipid Panel with Direct LDL reflex; Future  -     Comprehensive metabolic panel; Future  -     atorvastatin (LIPITOR) 40 mg tablet; Take 1 tablet (40 mg total) by mouth daily    Vitamin D deficiency    Age-related osteoporosis without current pathological fracture    Primary insomnia  -     traZODone (DESYREL) 100 mg tablet; Take 1 tablet (100 mg total) by mouth daily at bedtime    Anxiety    Other depression  -     FLUoxetine (PROzac) 40 MG capsule; Take 1 capsule (40 mg total) by mouth daily              Subjective:      Patient ID: Claus Boyer is a 57 y o  female  CC:    Chief Complaint   Patient presents with    Follow-up     Patient present today for her 6 month follow up         HPI:      Claus Boyer is here for chronic conditions f/u including the diagnosis of Mixed hyperlipidemia  (primary encounter diagnosis)  Vitamin d deficiency  Age-related osteoporosis without current pathological fracture  Primary insomnia  Anxiety  Other depression   Pt  states they are taking all medications as directed without complaints or side effects   Pt  had labs done prior to today's visit which included Recent Results (from the past 672 hour(s))  -Lipid Panel with Direct LDL reflex  Collection Time: 05/18/21 11:05 AM       Result                      Value             Ref Range           Cholesterol                 228 (H)           50 - 200 mg/*       Triglycerides               145               <=150 mg/dL         HDL, Direct                 65                >=40 mg/dL          LDL Calculated              134 (H)           0 - 100 mg/dL  -Comprehensive metabolic panel  Collection Time: 05/18/21 11:05 AM       Result                      Value             Ref Range           Sodium                      138               136 - 145 mm*       Potassium                   4 0               3 5 - 5 3 mm*       Chloride                    105               100 - 108 mm*       CO2                         28                21 - 32 mmol*       ANION GAP                   5                 4 - 13 mmol/L       BUN                         18                5 - 25 mg/dL        Creatinine                  0 75              0 60 - 1 30 *       Glucose, Fasting            89                65 - 99 mg/dL       Calcium                     9 3               8 3 - 10 1 m*       AST                         29                5 - 45 U/L          ALT                         64                12 - 78 U/L         Alkaline Phosphatase        111               46 - 116 U/L        Total Protein               7 3               6 4 - 8 2 g/*       Albumin                     3 8               3 5 - 5 0 g/*       Total Bilirubin             0 57              0 20 - 1 00 *       eGFR                        86                ml/min/1 73s*  -Vitamin D 25 hydroxy  Collection Time: 05/18/21 11:05 AM       Result                      Value             Ref Range           Vit D, 25-Hydroxy           37 7              30 0 - 100 0*        The following portions of the patient's history were reviewed and updated as appropriate: allergies, current medications, past family history, past medical history, past social history, past surgical history and problem list       Review of Systems   Constitutional: Negative  HENT: Negative  Eyes: Negative  Respiratory: Negative  Cardiovascular: Negative  Gastrointestinal: Negative  Endocrine: Negative  Genitourinary: Negative  Musculoskeletal: Negative  Skin: Negative  Allergic/Immunologic: Negative  Neurological: Negative  Hematological: Negative  Psychiatric/Behavioral: Negative  Data to review:       Objective:    Vitals:    06/01/21 1258   BP: 118/78   BP Location: Left arm   Patient Position: Sitting   Cuff Size: Large   Pulse: 84   Resp: 16   Temp: 98 9 °F (37 2 °C)   TempSrc: Tympanic   Weight: 76 7 kg (169 lb)   Height: 5' 5" (1 651 m)        Physical Exam  Vitals signs and nursing note reviewed  Constitutional:       Appearance: Normal appearance  She is well-developed  HENT:      Head: Normocephalic and atraumatic  Eyes:      General: Lids are normal       Conjunctiva/sclera: Conjunctivae normal       Pupils: Pupils are equal, round, and reactive to light  Cardiovascular:      Rate and Rhythm: Normal rate and regular rhythm  Heart sounds: No murmur  Pulmonary:      Effort: Pulmonary effort is normal       Breath sounds: Normal breath sounds  Skin:     General: Skin is warm and dry  Neurological:      General: No focal deficit present  Mental Status: She is alert  Coordination: Coordination is intact  Psychiatric:         Mood and Affect: Mood normal          Behavior: Behavior normal  Behavior is cooperative  Thought Content:  Thought content normal  Judgment: Judgment normal            BMI Counseling: Body mass index is 28 12 kg/m²  The BMI is above normal  Nutrition recommendations include decreasing portion sizes, encouraging healthy choices of fruits and vegetables, decreasing fast food intake, consuming healthier snacks, limiting drinks that contain sugar, moderation in carbohydrate intake, increasing intake of lean protein, reducing intake of saturated and trans fat and reducing intake of cholesterol  Exercise recommendations include exercising 3-5 times per week  No pharmacotherapy was ordered

## 2021-06-01 NOTE — PATIENT INSTRUCTIONS
Problem List Items Addressed This Visit        Musculoskeletal and Integument    Age-related osteoporosis without current pathological fracture       Other    Anxiety    Mixed hyperlipidemia - Primary    Vitamin D deficiency    Primary insomnia      Other Visit Diagnoses     Other depression

## 2021-06-01 NOTE — TELEPHONE ENCOUNTER
----- Message from Reese Kaiser, [address] [place] sent at 6/1/2021  2:01 PM EDT -----  Regarding: PAP SMEAR  06/01/21 2:02 PM    Hello, our patient Kimberlyn Sr has had Pap Smear (HPV) aka Cervical Cancer Screening completed/performed  Please assist in updating the patient chart by pulling the Care Everywhere (CE) document  The date of service is 9/19/2016       Thank you,  Reese Kaiser MA  [facility] PRIMARY CARE

## 2021-06-01 NOTE — TELEPHONE ENCOUNTER
Upon review of the In Basket request we were able to locate, review, and update the patient chart as requested for Pap Smear (HPV) aka Cervical Cancer Screening  Any additional questions or concerns should be emailed to the Practice Liaisons via [email or web address] email, please do not reply via In Basket      Thank you  Zahraa Oh

## 2021-06-01 NOTE — ASSESSMENT & PLAN NOTE
Trigs improved from 168 to 134  LDL slightly higher from 116 to 134  No change and continue lipitor 40  Check in 6 months

## 2021-06-02 ENCOUNTER — TELEPHONE (OUTPATIENT)
Dept: ADMINISTRATIVE | Facility: OTHER | Age: 61
End: 2021-06-02

## 2021-06-02 NOTE — TELEPHONE ENCOUNTER
Upon review of the In Basket request we have found this is a duplicate request and no further action is needed  This request was completed upon initial request, the patient chart is up to date, and this message will now be closed  Any additional questions or concerns should be emailed to the Practice Liaisons via [email or web address] email, please do not reply via In Basket      Thank you  Jose R Hall

## 2021-06-02 NOTE — TELEPHONE ENCOUNTER
----- Message from [address] sent at 6/1/2021  2:42 PM EDT -----  Regarding: [facility] Primary Care  06/01/21 2:42 PM    Hello, our patient Jose Alfredo Hester has had Pap Smear (HPV) aka Cervical Cancer Screening completed/performed  Please assist in updating the patient chart by pulling the Care Everywhere (CE) document  The date of service is 9/19/2016       Thank you,  Gabriella Hamilton [facility] PRIMARY CARE

## 2021-06-10 ENCOUNTER — HOSPITAL ENCOUNTER (OUTPATIENT)
Dept: BONE DENSITY | Facility: MEDICAL CENTER | Age: 61
Discharge: HOME | End: 2021-06-10
Payer: COMMERCIAL

## 2021-06-10 DIAGNOSIS — M81.0 AGE-RELATED OSTEOPOROSIS WITHOUT CURRENT PATHOLOGICAL FRACTURE: ICD-10-CM

## 2021-06-10 PROCEDURE — 77080 DXA BONE DENSITY AXIAL: CPT

## 2021-06-15 ENCOUNTER — TELEPHONE (OUTPATIENT)
Dept: FAMILY MEDICINE CLINIC | Facility: CLINIC | Age: 61
End: 2021-06-15

## 2021-06-15 DIAGNOSIS — M81.0 AGE-RELATED OSTEOPOROSIS WITHOUT CURRENT PATHOLOGICAL FRACTURE: Primary | ICD-10-CM

## 2021-06-15 NOTE — TELEPHONE ENCOUNTER
----- Message from Mahesh Cadet PA-C sent at 6/15/2021  9:38 AM EDT -----  Please let pt know that her DEXA shows her osteoporosis has worsened since the last two years ago  Score is 12% worse than 2019  I would like her to re eval with endo to discuss treatment options again  Order is placed

## 2021-06-15 NOTE — RESULT ENCOUNTER NOTE
Please let pt know that her DEXA shows her osteoporosis has worsened since the last two years ago  Score is 12% worse than 2019  I would like her to re eval with endo to discuss treatment options again  Order is placed

## 2021-07-02 ENCOUNTER — OFFICE VISIT (OUTPATIENT)
Dept: ENDOCRINOLOGY | Facility: CLINIC | Age: 61
End: 2021-07-02
Payer: COMMERCIAL

## 2021-07-02 ENCOUNTER — TELEPHONE (OUTPATIENT)
Dept: ENDOCRINOLOGY | Facility: CLINIC | Age: 61
End: 2021-07-02

## 2021-07-02 VITALS
HEART RATE: 86 BPM | SYSTOLIC BLOOD PRESSURE: 120 MMHG | BODY MASS INDEX: 28.16 KG/M2 | DIASTOLIC BLOOD PRESSURE: 84 MMHG | WEIGHT: 169 LBS | HEIGHT: 65 IN

## 2021-07-02 DIAGNOSIS — E55.9 VITAMIN D DEFICIENCY: ICD-10-CM

## 2021-07-02 DIAGNOSIS — M81.0 AGE-RELATED OSTEOPOROSIS WITHOUT CURRENT PATHOLOGICAL FRACTURE: Primary | ICD-10-CM

## 2021-07-02 DIAGNOSIS — E78.2 MIXED HYPERLIPIDEMIA: ICD-10-CM

## 2021-07-02 PROCEDURE — 99214 OFFICE O/P EST MOD 30 MIN: CPT | Performed by: INTERNAL MEDICINE

## 2021-07-02 PROCEDURE — 3008F BODY MASS INDEX DOCD: CPT | Performed by: INTERNAL MEDICINE

## 2021-07-02 NOTE — PROGRESS NOTES
Ronnie Robles 57 y o  female MRN: [id number]    Encounter: [id number]      Assessment/Plan     Assessment: This is a 57y o -year-old female with osteoporosis, vitamin-D deficiency and hyperlipidemia  Plan:   1  Osteoporosis- although her FRAX score does not support starting treatment, she has had significant deterioration in her bone density  Therefore, I recommended that she use pharmacologic therapy for the treatment of the osteoporosis  She is agreeable to starting Prolia  Will do a prior authorization for this  2  Vitamin-D deficiency-continue vitamin-D supplementation  3   Hyperlipidemia- continue fish oil  CC:     Osteoporosis    History of Present Illness     HPI:   69-year-old woman with osteoporosis presents for follow-up  I had seen her two years ago at which time we had elected not to start treatment  She returns today with a new DEXA scan showing worsening of her bone density  She denies any falls or fractures  She does not take supplemental calcium but does take vitamin-D  For hyperlipidemia, she is on fish oil  Review of Systems   Constitutional: Negative for chills and fever  Respiratory: Negative for shortness of breath  Cardiovascular: Negative for chest pain  Gastrointestinal: Negative for constipation, diarrhea, nausea and vomiting  All other systems reviewed and are negative  Historical Information   Past Medical History:   Diagnosis Date    Anxiety     Hypercholesteremia      No past surgical history on file    Social History   Social History     Substance and Sexual Activity   Alcohol Use Yes    Comment: social     Social History     Substance and Sexual Activity   Drug Use Never     Social History     Tobacco Use   Smoking Status Never Smoker   Smokeless Tobacco Never Used     Family History:   Family History   Problem Relation Age of Onset    Heart attack Mother     Arthritis Mother     Diabetes Mother     Hypertension Mother     Hypothyroidism Mother     Aneurysm Father         Abd Aorta    Heart disease Father         CABG    Anxiety disorder Paternal Grandmother     No Known Problems Maternal Grandmother     No Known Problems Maternal Grandfather     No Known Problems Paternal Grandfather     Lung cancer Paternal Aunt         age unknown       Meds/Allergies   Current Outpatient Medications   Medication Sig Dispense Refill    ALPRAZolam (XANAX) 0 5 mg tablet Take 1 tablet (0 5 mg total) by mouth 2 (two) times a day as needed for anxiety 30 tablet 0    aspirin 81 MG tablet Take 1 tablet by mouth daily      atorvastatin (LIPITOR) 40 mg tablet Take 1 tablet (40 mg total) by mouth daily 90 tablet 3    Cholecalciferol ([employer] VITAMIN D) 2000 units CAPS Take 1 capsule by mouth daily      Cyanocobalamin (VITAMIN B12 PO) Take by mouth      FLUoxetine (PROzac) 40 MG capsule Take 1 capsule (40 mg total) by mouth daily 90 capsule 3    Menaquinone-7 (VITAMIN K2 PO) Take by mouth      Multiple Vitamin (DAILY VALUE MULTIVITAMIN) TABS Take 1 tablet by mouth daily      Omega-3 Fatty Acids (FISH OIL) 600 MG CAPS Take by mouth      traZODone (DESYREL) 100 mg tablet Take 1 tablet (100 mg total) by mouth daily at bedtime 30 tablet 11    vitamin E, tocopherol, 400 units capsule Take by mouth      pyridoxine (VITAMIN B6) 100 mg tablet Take 100 mg by mouth daily (Patient not taking: Reported on 7/2/2021)       No current facility-administered medications for this visit  Allergies   Allergen Reactions    Augmentin [Amoxicillin-Pot Clavulanate] Abdominal Pain    Penicillins GI Intolerance       Objective   Vitals: Blood pressure 120/84, pulse 86, height 5' 5" (1 651 m), weight 76 7 kg (169 lb)  Physical Exam  Vitals reviewed  Constitutional:       General: She is not in acute distress  Appearance: She is well-developed  She is not diaphoretic  HENT:      Head: Normocephalic and atraumatic     Eyes:      General: Lids are normal  No scleral icterus  Right eye: No discharge  Left eye: No discharge  Conjunctiva/sclera: Conjunctivae normal    Neck:      Thyroid: No thyromegaly  Cardiovascular:      Rate and Rhythm: Normal rate and regular rhythm  Heart sounds: Normal heart sounds  No murmur heard  No friction rub  No gallop  Pulmonary:      Effort: Pulmonary effort is normal  No respiratory distress  Breath sounds: Normal breath sounds  No wheezing  Abdominal:      General: Bowel sounds are normal  There is no distension  Palpations: Abdomen is soft  Tenderness: There is no abdominal tenderness  Musculoskeletal:         General: No tenderness or deformity  Normal range of motion  Cervical back: Neck supple  Lymphadenopathy:      Head:      Right side of head: No occipital adenopathy  Left side of head: No occipital adenopathy  Upper Body:      Right upper body: No supraclavicular adenopathy  Left upper body: No supraclavicular adenopathy  Skin:     General: Skin is warm  Findings: No erythema or rash  Neurological:      Mental Status: She is alert and oriented to person, place, and time  Cranial Nerves: No cranial nerve deficit  Psychiatric:         Behavior: Behavior normal          The history was obtained from the review of the chart, patient      Lab Results:   Lab Results   Component Value Date/Time    Potassium 4 0 05/18/2021 11:05 AM    Potassium 3 4 (L) 11/05/2020 09:22 AM    Chloride 105 05/18/2021 11:05 AM    Chloride 109 (H) 11/05/2020 09:22 AM    CO2 28 05/18/2021 11:05 AM    CO2 27 11/05/2020 09:22 AM    BUN 18 05/18/2021 11:05 AM    BUN 18 11/05/2020 09:22 AM    Creatinine 0 75 05/18/2021 11:05 AM    Creatinine 0 79 11/05/2020 09:22 AM    Glucose, Fasting 89 05/18/2021 11:05 AM    Glucose, Fasting 83 11/05/2020 09:22 AM    Calcium 9 3 05/18/2021 11:05 AM    Calcium 9 6 11/05/2020 09:22 AM    eGFR 86 05/18/2021 11:05 AM    eGFR 81 11/05/2020 09:22 AM Vit D, 25-Hydroxy 37 7 05/18/2021 11:05 AM         Imaging Studies:         Results for orders placed during the hospital encounter of 06/10/21    DXA bone density spine hip and pelvis    Impression  1  Based on the [facility] classification, this study identifies a diagnosis of osteoporosis, notable at the spine area and and the patient is considered at risk for fracture  2  A daily intake of calcium of at least 1200 mg and vitamin D, 800-1000 IU, as well as weight bearing and muscle strengthening exercise, fall prevention and avoidance of tobacco and excessive alcohol intake as basic preventive measures are recommended  3  Repeat DXA scan on the same equipment in 18-24 months as clinically indicated  The 10 year risk of hip fracture is 1 6%, with the 10 year risk of major osteoporotic fracture being 20%, as calculated by the [facility] fracture risk assessment tool (FRAX)  The current NOF guidelines recommend treating patients with FRAX 10 year risk score  of >3% for hip fracture and >20% for major osteoporotic fracture  Risk of major osteoporotic fracture equals treatment thresholds  WHO CLASSIFICATION:  Normal (a T-score of -1 0 or higher)  Low bone mineral density (a T-score of less than -1 0 but higher than -2 5)  Osteoporosis (a T-score of -2 5 or less)  Severe osteoporosis (a T-score of -2 5 or less with a fragility fracture)    Thank you for allowing us the opportunity to participate in your patient care  The expanded DEXA report will no longer be arriving in your mail  If you desire to view the full report please contact [facility] or access the PACS system          Workstation performed: [id number]            I have personally reviewed pertinent reports  Portions of the record may have been created with voice recognition software  Occasional wrong word or "sound a like" substitutions may have occurred due to the inherent limitations of voice recognition software   Read the chart carefully and recognize, using context, where substitutions have occurred

## 2021-07-06 NOTE — TELEPHONE ENCOUNTER
Dorian HU submitted via [employer] for [employer]  PA reference # [id number]    Insurance verification received  PA required  OOP 10% and admin fee of 10% until yearly deductible is met  Pt qualifies for [employer] copay assistance

## 2021-07-09 NOTE — TELEPHONE ENCOUNTER
[address] [place], [place] # [id number], Eff 7/6/2021 - 7/6/2022, Quantity # 120 units    Please call pt and schedule Prolia injection  Pt can apply for Prolia copay assistance to help cover the 10% cost she is responsible for  She just needs to call [phone]  Let me know if she has any other questions      Thanks

## 2021-07-12 ENCOUNTER — CLINICAL SUPPORT (OUTPATIENT)
Dept: ENDOCRINOLOGY | Facility: CLINIC | Age: 61
End: 2021-07-12
Payer: COMMERCIAL

## 2021-07-12 DIAGNOSIS — M81.0 AGE-RELATED OSTEOPOROSIS WITHOUT CURRENT PATHOLOGICAL FRACTURE: Primary | ICD-10-CM

## 2021-07-12 PROCEDURE — 96372 THER/PROPH/DIAG INJ SC/IM: CPT | Performed by: INTERNAL MEDICINE

## 2021-08-05 ENCOUNTER — VBI (OUTPATIENT)
Dept: ADMINISTRATIVE | Facility: OTHER | Age: 61
End: 2021-08-05

## 2021-10-20 ENCOUNTER — VBI (OUTPATIENT)
Dept: ADMINISTRATIVE | Facility: OTHER | Age: 61
End: 2021-10-20

## 2021-12-10 ENCOUNTER — APPOINTMENT (OUTPATIENT)
Dept: LAB | Facility: CLINIC | Age: 61
End: 2021-12-10
Payer: COMMERCIAL

## 2021-12-10 DIAGNOSIS — E78.2 MIXED HYPERLIPIDEMIA: ICD-10-CM

## 2021-12-10 LAB
ALBUMIN SERPL BCP-MCNC: 4.1 G/DL (ref 3.5–5)
ALP SERPL-CCNC: 81 U/L (ref 46–116)
ALT SERPL W P-5'-P-CCNC: 50 U/L (ref 12–78)
ANION GAP SERPL CALCULATED.3IONS-SCNC: 9 MMOL/L (ref 4–13)
AST SERPL W P-5'-P-CCNC: 30 U/L (ref 5–45)
BILIRUB SERPL-MCNC: 0.54 MG/DL (ref 0.2–1)
BUN SERPL-MCNC: 24 MG/DL (ref 5–25)
CALCIUM SERPL-MCNC: 10.7 MG/DL (ref 8.3–10.1)
CHLORIDE SERPL-SCNC: 108 MMOL/L (ref 100–108)
CHOLEST SERPL-MCNC: 207 MG/DL
CO2 SERPL-SCNC: 22 MMOL/L (ref 21–32)
CREAT SERPL-MCNC: 0.85 MG/DL (ref 0.6–1.3)
GFR SERPL CREATININE-BSD FRML MDRD: 74 ML/MIN/1.73SQ M
GLUCOSE P FAST SERPL-MCNC: 84 MG/DL (ref 65–99)
HDLC SERPL-MCNC: 62 MG/DL
LDLC SERPL CALC-MCNC: 128 MG/DL (ref 0–100)
POTASSIUM SERPL-SCNC: 3.8 MMOL/L (ref 3.5–5.3)
PROT SERPL-MCNC: 7.7 G/DL (ref 6.4–8.2)
SODIUM SERPL-SCNC: 139 MMOL/L (ref 136–145)
TRIGL SERPL-MCNC: 86 MG/DL

## 2021-12-10 PROCEDURE — 80053 COMPREHEN METABOLIC PANEL: CPT

## 2021-12-10 PROCEDURE — 80061 LIPID PANEL: CPT

## 2021-12-10 PROCEDURE — 36415 COLL VENOUS BLD VENIPUNCTURE: CPT

## 2021-12-17 ENCOUNTER — OFFICE VISIT (OUTPATIENT)
Dept: FAMILY MEDICINE CLINIC | Facility: CLINIC | Age: 61
End: 2021-12-17
Payer: COMMERCIAL

## 2021-12-17 VITALS
HEART RATE: 94 BPM | BODY MASS INDEX: 27.16 KG/M2 | OXYGEN SATURATION: 96 % | HEIGHT: 65 IN | DIASTOLIC BLOOD PRESSURE: 64 MMHG | SYSTOLIC BLOOD PRESSURE: 110 MMHG | WEIGHT: 163 LBS

## 2021-12-17 DIAGNOSIS — E55.9 VITAMIN D DEFICIENCY: ICD-10-CM

## 2021-12-17 DIAGNOSIS — R79.89 ELEVATED LFTS: ICD-10-CM

## 2021-12-17 DIAGNOSIS — E78.2 MIXED HYPERLIPIDEMIA: Primary | ICD-10-CM

## 2021-12-17 DIAGNOSIS — E83.52 HYPERCALCEMIA: ICD-10-CM

## 2021-12-17 DIAGNOSIS — M81.0 AGE-RELATED OSTEOPOROSIS WITHOUT CURRENT PATHOLOGICAL FRACTURE: ICD-10-CM

## 2021-12-17 PROCEDURE — 1036F TOBACCO NON-USER: CPT | Performed by: PHYSICIAN ASSISTANT

## 2021-12-17 PROCEDURE — 99214 OFFICE O/P EST MOD 30 MIN: CPT | Performed by: PHYSICIAN ASSISTANT

## 2022-01-19 ENCOUNTER — TELEPHONE (OUTPATIENT)
Dept: ENDOCRINOLOGY | Facility: CLINIC | Age: 62
End: 2022-01-19

## 2022-01-25 ENCOUNTER — TELEPHONE (OUTPATIENT)
Dept: ENDOCRINOLOGY | Facility: CLINIC | Age: 62
End: 2022-01-25

## 2022-01-25 NOTE — TELEPHONE ENCOUNTER
Confirmed via [employer] that current PA is still active  PA # [id number], Exp 7/6/2022, Quantity # 1 visit remaining    Insurance verification submitted for year 2022

## 2022-01-31 ENCOUNTER — LAB (OUTPATIENT)
Dept: LAB | Facility: CLINIC | Age: 62
End: 2022-01-31
Payer: COMMERCIAL

## 2022-01-31 ENCOUNTER — OFFICE VISIT (OUTPATIENT)
Dept: ENDOCRINOLOGY | Facility: CLINIC | Age: 62
End: 2022-01-31
Payer: COMMERCIAL

## 2022-01-31 VITALS
HEART RATE: 76 BPM | DIASTOLIC BLOOD PRESSURE: 70 MMHG | SYSTOLIC BLOOD PRESSURE: 110 MMHG | HEIGHT: 65 IN | BODY MASS INDEX: 26.66 KG/M2 | WEIGHT: 160 LBS

## 2022-01-31 DIAGNOSIS — E78.2 MIXED HYPERLIPIDEMIA: ICD-10-CM

## 2022-01-31 DIAGNOSIS — E83.52 HYPERCALCEMIA: ICD-10-CM

## 2022-01-31 DIAGNOSIS — M81.0 AGE-RELATED OSTEOPOROSIS WITHOUT CURRENT PATHOLOGICAL FRACTURE: Primary | ICD-10-CM

## 2022-01-31 DIAGNOSIS — E55.9 VITAMIN D DEFICIENCY: ICD-10-CM

## 2022-01-31 LAB
25(OH)D3 SERPL-MCNC: 33.7 NG/ML (ref 30–100)
CALCIUM SERPL-MCNC: 9.8 MG/DL (ref 8.3–10.1)
PHOSPHATE SERPL-MCNC: 2.7 MG/DL (ref 2.3–4.1)
PTH-INTACT SERPL-MCNC: 24.8 PG/ML (ref 18.4–80.1)

## 2022-01-31 PROCEDURE — 36415 COLL VENOUS BLD VENIPUNCTURE: CPT | Performed by: PHYSICIAN ASSISTANT

## 2022-01-31 PROCEDURE — 83970 ASSAY OF PARATHORMONE: CPT | Performed by: PHYSICIAN ASSISTANT

## 2022-01-31 PROCEDURE — 96372 THER/PROPH/DIAG INJ SC/IM: CPT | Performed by: INTERNAL MEDICINE

## 2022-01-31 PROCEDURE — 3008F BODY MASS INDEX DOCD: CPT | Performed by: PHYSICIAN ASSISTANT

## 2022-01-31 PROCEDURE — 82306 VITAMIN D 25 HYDROXY: CPT | Performed by: PHYSICIAN ASSISTANT

## 2022-01-31 PROCEDURE — 84100 ASSAY OF PHOSPHORUS: CPT | Performed by: PHYSICIAN ASSISTANT

## 2022-01-31 PROCEDURE — 99213 OFFICE O/P EST LOW 20 MIN: CPT

## 2022-01-31 PROCEDURE — 82310 ASSAY OF CALCIUM: CPT

## 2022-01-31 NOTE — PROGRESS NOTES
Patient is here for 6 month Prolia injection  Last given on 07/12/2021  Ni HU on file   Med supplied by our office  ABN and consent form signed by the patient  Verbal consent given  Injection administered in left arm  Patient tolerated procedure well

## 2022-01-31 NOTE — ASSESSMENT & PLAN NOTE
Received Prolia injection today  Continue with 1,200 mg calcium in diet daily  Reviewed calcium sources (yogurt, cheese, broccoli, green leafy vegetables) and importance of regular weight bearing exercise  Labs done today, still pending

## 2022-01-31 NOTE — PROGRESS NOTES
Established Patient Progress Note      Chief Complaint   Patient presents with    Osteoporosis        History of Present Illness:   Raghu Le is a 57 y o  female with osteoporosis, vitamin-D deficiency and hyperlipidemia  For osteoporosis she is on Prolia  DEXA scan done 6/2021 showed worsening of bone density, in which Prolia injections were started 7/2021  She is due for Prolia today  For vitamin-D deficiency, taking 2,000 units daily  She gets calcium through her diet  Not currently exercising  Denies recent fracture, falls, kidney stones, or jaw pain  Reports increased fatigue since COVID infection, denies any other complaints  Patient Active Problem List   Diagnosis    Allergic rhinitis    Anxiety    Mixed hyperlipidemia    Vitamin D deficiency    Age-related osteoporosis without current pathological fracture    Asymptomatic menopause    Primary insomnia    Elevated LFTs    Hypercalcemia      Past Medical History:   Diagnosis Date    Anxiety     Hypercholesteremia       History reviewed  No pertinent surgical history     Family History   Problem Relation Age of Onset    Heart attack Mother     Arthritis Mother     Diabetes Mother     Hypertension Mother     Hypothyroidism Mother     Aneurysm Father         Abd Aorta    Heart disease Father         CABG    Anxiety disorder Paternal Grandmother     No Known Problems Maternal Grandmother     No Known Problems Maternal Grandfather     No Known Problems Paternal Grandfather     Lung cancer Paternal Aunt         age unknown     Social History     Tobacco Use    Smoking status: Never Smoker    Smokeless tobacco: Never Used   Substance Use Topics    Alcohol use: Yes     Comment: social     Allergies   Allergen Reactions    Augmentin [Amoxicillin-Pot Clavulanate] Abdominal Pain    Penicillins GI Intolerance         Current Outpatient Medications:     ALPRAZolam (XANAX) 0 5 mg tablet, Take 1 tablet (0 5 mg total) by mouth 2 (two) times a day as needed for anxiety, Disp: 30 tablet, Rfl: 0    aspirin 81 MG tablet, Take 1 tablet by mouth daily, Disp: , Rfl:     atorvastatin (LIPITOR) 40 mg tablet, Take 1 tablet (40 mg total) by mouth daily, Disp: 90 tablet, Rfl: 3    Cholecalciferol ([employer] VITAMIN D) 2000 units CAPS, Take 1 capsule by mouth daily, Disp: , Rfl:     Cyanocobalamin (VITAMIN B12 PO), Take by mouth, Disp: , Rfl:     FLUoxetine (PROzac) 40 MG capsule, Take 1 capsule (40 mg total) by mouth daily, Disp: 90 capsule, Rfl: 3    Multiple Vitamin (DAILY VALUE MULTIVITAMIN) TABS, Take 1 tablet by mouth daily, Disp: , Rfl:     Omega-3 Fatty Acids (FISH OIL) 600 MG CAPS, Take by mouth, Disp: , Rfl:     vitamin E, tocopherol, 400 units capsule, Take by mouth, Disp: , Rfl:     Current Facility-Administered Medications:     denosumab (PROLIA) subcutaneous injection 60 mg, 60 mg, Subcutaneous, Q6 Months, Franklin Garner MD, 60 mg at 01/31/22 1527    Review of Systems   Constitutional: Positive for fatigue (had recent covid infection)  Negative for activity change, appetite change and unexpected weight change  HENT: Negative for sore throat, trouble swallowing and voice change  Eyes: Negative for visual disturbance  Respiratory: Negative for cough, chest tightness, shortness of breath and wheezing  Cardiovascular: Negative for chest pain, palpitations and leg swelling  Gastrointestinal: Negative for abdominal pain, constipation, diarrhea, nausea and vomiting  Endocrine: Negative for cold intolerance, heat intolerance, polydipsia and polyuria  Musculoskeletal: Negative for arthralgias, back pain and gait problem  Skin: Negative for color change, pallor, rash and wound  Neurological: Negative for dizziness, tremors, weakness, light-headedness and numbness  All other systems reviewed and are negative  Physical Exam:  Body mass index is 26 63 kg/m²    /70   Pulse 76   Ht 5' 5" (1 651 m)   Wt 72 6 kg (160 lb) BMI 26 63 kg/m²    Wt Readings from Last 3 Encounters:   01/31/22 72 6 kg (160 lb)   12/17/21 73 9 kg (163 lb)   07/02/21 76 7 kg (169 lb)       Physical Exam  Vitals reviewed  Constitutional:       General: She is not in acute distress  Appearance: Normal appearance  She is normal weight  She is not ill-appearing or diaphoretic  HENT:      Head: Normocephalic  Right Ear: External ear normal       Left Ear: External ear normal    Eyes:      Extraocular Movements: Extraocular movements intact  Conjunctiva/sclera: Conjunctivae normal       Pupils: Pupils are equal, round, and reactive to light  Cardiovascular:      Rate and Rhythm: Normal rate and regular rhythm  Pulses: Normal pulses  Heart sounds: Normal heart sounds  No murmur heard  No friction rub  No gallop  Pulmonary:      Effort: Pulmonary effort is normal  No respiratory distress  Breath sounds: Normal breath sounds  No stridor  No wheezing, rhonchi or rales  Abdominal:      General: Bowel sounds are normal  There is no distension  Palpations: Abdomen is soft  Tenderness: There is no abdominal tenderness  Musculoskeletal:         General: No swelling, tenderness or signs of injury  Normal range of motion  Cervical back: Normal range of motion and neck supple  Right lower leg: No edema  Left lower leg: No edema  Skin:     General: Skin is warm and dry  Coloration: Skin is not jaundiced  Findings: No bruising, erythema or rash  Neurological:      General: No focal deficit present  Mental Status: She is alert and oriented to person, place, and time  Sensory: No sensory deficit  Motor: No weakness  Coordination: Coordination normal       Gait: Gait normal    Psychiatric:         Mood and Affect: Mood normal          Behavior: Behavior normal          Thought Content:  Thought content normal          Judgment: Judgment normal          Labs:     Lab Results Component Value Date    CREATININE 0 85 12/10/2021    CREATININE 0 75 05/18/2021    CREATININE 0 79 11/05/2020    BUN 24 12/10/2021     02/26/2015    K 3 8 12/10/2021     12/10/2021    CO2 22 12/10/2021     eGFR   Date Value Ref Range Status   12/10/2021 74 ml/min/1 73sq m Final     Lab Results   Component Value Date    CHOL 215 02/26/2015    HDL 62 12/10/2021    TRIG 86 12/10/2021     Lab Results   Component Value Date    ALT 50 12/10/2021    AST 30 12/10/2021    ALKPHOS 81 12/10/2021    BILITOT 0 53 02/26/2015     Lab Results   Component Value Date    [id number] 3 440 07/31/2019     Lab Results   Component Value Date    FREET4 0 99 07/31/2019       Impression & Plan:    Problem List Items Addressed This Visit        Musculoskeletal and Integument    Age-related osteoporosis without current pathological fracture - Primary     Received Prolia injection today  Continue with 1,200 mg calcium in diet daily  Reviewed calcium sources (yogurt, cheese, broccoli, green leafy vegetables) and importance of regular weight bearing exercise  Labs done today, still pending  Other    Mixed hyperlipidemia     Had lipid panel done today, still in process  Continue lipitor  Vitamin D deficiency     Continue 2,000 units daily  No orders of the defined types were placed in this encounter  There are no Patient Instructions on file for this visit  Discussed with the patient and all questioned fully answered  She will call me if any problems arise      DAVON Patterson

## 2022-02-01 NOTE — RESULT ENCOUNTER NOTE
Please let pt know her repeat high calcium work up was normal  NO further eval needed  Thank you 
Disoriented

## 2022-02-03 NOTE — TELEPHONE ENCOUNTER
Verification received  PA required and on file  OOP cost for Prolia 10% until yearly deductible is met and 10% admin fee   Pt's plan is commercial and qualifies for copay program

## 2022-05-27 DIAGNOSIS — F32.89 OTHER DEPRESSION: ICD-10-CM

## 2022-05-27 DIAGNOSIS — E78.2 MIXED HYPERLIPIDEMIA: ICD-10-CM

## 2022-05-27 RX ORDER — ATORVASTATIN CALCIUM 40 MG/1
TABLET, FILM COATED ORAL
Qty: 90 TABLET | Refills: 3 | Status: SHIPPED | OUTPATIENT
Start: 2022-05-27

## 2022-05-27 RX ORDER — FLUOXETINE HYDROCHLORIDE 40 MG/1
CAPSULE ORAL
Qty: 90 CAPSULE | Refills: 3 | Status: SHIPPED | OUTPATIENT
Start: 2022-05-27

## 2022-06-21 ENCOUNTER — APPOINTMENT (OUTPATIENT)
Dept: LAB | Facility: MEDICAL CENTER | Age: 62
End: 2022-06-21
Payer: COMMERCIAL

## 2022-06-21 LAB
ALBUMIN SERPL BCP-MCNC: 3.7 G/DL (ref 3.5–5)
ALP SERPL-CCNC: 76 U/L (ref 46–116)
ALT SERPL W P-5'-P-CCNC: 58 U/L (ref 12–78)
ANION GAP SERPL CALCULATED.3IONS-SCNC: 5 MMOL/L (ref 4–13)
AST SERPL W P-5'-P-CCNC: 35 U/L (ref 5–45)
BILIRUB SERPL-MCNC: 0.42 MG/DL (ref 0.2–1)
BUN SERPL-MCNC: 13 MG/DL (ref 5–25)
CALCIUM SERPL-MCNC: 9 MG/DL (ref 8.3–10.1)
CHLORIDE SERPL-SCNC: 109 MMOL/L (ref 100–108)
CHOLEST SERPL-MCNC: 196 MG/DL
CO2 SERPL-SCNC: 25 MMOL/L (ref 21–32)
CREAT SERPL-MCNC: 0.78 MG/DL (ref 0.6–1.3)
GFR SERPL CREATININE-BSD FRML MDRD: 81 ML/MIN/1.73SQ M
GLUCOSE P FAST SERPL-MCNC: 86 MG/DL (ref 65–99)
HDLC SERPL-MCNC: 61 MG/DL
LDLC SERPL CALC-MCNC: 116 MG/DL (ref 0–100)
POTASSIUM SERPL-SCNC: 3.8 MMOL/L (ref 3.5–5.3)
PROT SERPL-MCNC: 7.2 G/DL (ref 6.4–8.2)
SODIUM SERPL-SCNC: 139 MMOL/L (ref 136–145)
TRIGL SERPL-MCNC: 97 MG/DL

## 2022-06-21 PROCEDURE — 80053 COMPREHEN METABOLIC PANEL: CPT

## 2022-06-21 PROCEDURE — 36415 COLL VENOUS BLD VENIPUNCTURE: CPT

## 2022-06-21 PROCEDURE — 80061 LIPID PANEL: CPT

## 2022-06-24 ENCOUNTER — OFFICE VISIT (OUTPATIENT)
Dept: FAMILY MEDICINE CLINIC | Facility: CLINIC | Age: 62
End: 2022-06-24
Payer: COMMERCIAL

## 2022-06-24 VITALS
BODY MASS INDEX: 26.66 KG/M2 | OXYGEN SATURATION: 98 % | WEIGHT: 160 LBS | DIASTOLIC BLOOD PRESSURE: 70 MMHG | SYSTOLIC BLOOD PRESSURE: 128 MMHG | HEIGHT: 65 IN | HEART RATE: 82 BPM

## 2022-06-24 DIAGNOSIS — E55.9 VITAMIN D DEFICIENCY: ICD-10-CM

## 2022-06-24 DIAGNOSIS — Z12.31 ENCOUNTER FOR SCREENING MAMMOGRAM FOR MALIGNANT NEOPLASM OF BREAST: ICD-10-CM

## 2022-06-24 DIAGNOSIS — E78.2 MIXED HYPERLIPIDEMIA: Primary | ICD-10-CM

## 2022-06-24 DIAGNOSIS — R79.89 ELEVATED LFTS: ICD-10-CM

## 2022-06-24 DIAGNOSIS — M81.0 AGE-RELATED OSTEOPOROSIS WITHOUT CURRENT PATHOLOGICAL FRACTURE: ICD-10-CM

## 2022-06-24 DIAGNOSIS — F41.9 ANXIETY: ICD-10-CM

## 2022-06-24 PROBLEM — E83.52 HYPERCALCEMIA: Status: RESOLVED | Noted: 2021-12-17 | Resolved: 2022-06-24

## 2022-06-24 PROCEDURE — 3008F BODY MASS INDEX DOCD: CPT | Performed by: PHYSICIAN ASSISTANT

## 2022-06-24 PROCEDURE — 99213 OFFICE O/P EST LOW 20 MIN: CPT | Performed by: PHYSICIAN ASSISTANT

## 2022-06-24 PROCEDURE — 1036F TOBACCO NON-USER: CPT | Performed by: PHYSICIAN ASSISTANT

## 2022-06-24 PROCEDURE — 3725F SCREEN DEPRESSION PERFORMED: CPT | Performed by: PHYSICIAN ASSISTANT

## 2022-06-24 RX ORDER — ALPRAZOLAM 0.5 MG/1
0.5 TABLET ORAL 2 TIMES DAILY PRN
Qty: 30 TABLET | Refills: 0 | Status: SHIPPED | OUTPATIENT
Start: 2022-06-24 | End: 2022-07-28

## 2022-06-24 NOTE — PROGRESS NOTES
Assessment and Plan:    Problem List Items Addressed This Visit        Musculoskeletal and Integument    Age-related osteoporosis without current pathological fracture     Continue with endocrinology and Prolia  Other    Anxiety     Stable on Prozac and very infrequent Xanax  Relevant Medications    ALPRAZolam (XANAX) 0 5 mg tablet    Mixed hyperlipidemia - Primary     Patient is on Lipitor 40 mg once daily keeping her LDL good enough for her at 116  continue recheck 6 months  Relevant Orders    Lipid Panel with Direct LDL reflex    Comprehensive metabolic panel    Vitamin D deficiency     Check vitamin-D with next labs  Relevant Orders    Vitamin D 25 hydroxy    RESOLVED: Elevated LFTs      Other Visit Diagnoses     Encounter for screening mammogram for malignant neoplasm of breast        Relevant Orders    Mammo screening bilateral w 3d & cad                 Diagnoses and all orders for this visit:    Mixed hyperlipidemia  -     Lipid Panel with Direct LDL reflex; Future  -     Comprehensive metabolic panel; Future    Vitamin D deficiency  -     Vitamin D 25 hydroxy; Future    Age-related osteoporosis without current pathological fracture    Anxiety  -     ALPRAZolam (XANAX) 0 5 mg tablet; Take 1 tablet (0 5 mg total) by mouth 2 (two) times a day as needed for anxiety    Elevated LFTs    Encounter for screening mammogram for malignant neoplasm of breast  -     Mammo screening bilateral w 3d & cad; Future            Subjective:      Patient ID: Tip Pereira is a 60 y o  female  CC:    Chief Complaint   Patient presents with    Follow-up     Patient present today for her 6 month follow up         HPI:      Tip Pereira is here for chronic conditions f/u including the diagnosis of Mixed hyperlipidemia  (primary encounter diagnosis)  Vitamin d deficiency  Age-related osteoporosis without current pathological fracture  Anxiety  Elevated lfts  Encounter for screening mammogram for malignant neoplasm of breast   Pt  states they are taking all medications as directed without complaints or side effects   Pt  had labs done prior to today's visit which included Recent Results (from the past 672 hour(s))  -Lipid Panel with Direct LDL reflex:   Collection Time: 06/21/22 11:49 AM       Result                      Value             Ref Range           Cholesterol                 196               See Comment *       Triglycerides               97                See Comment *       HDL, Direct                 61                >=50 mg/dL          LDL Calculated              116 (H)           0 - 100 mg/dL  -Comprehensive metabolic panel:   Collection Time: 06/21/22 11:49 AM       Result                      Value             Ref Range           Sodium                      139               136 - 145 mm*       Potassium                   3 8               3 5 - 5 3 mm*       Chloride                    109 (H)           100 - 108 mm*       CO2                         25                21 - 32 mmol*       ANION GAP                   5                 4 - 13 mmol/L       BUN                         13                5 - 25 mg/dL        Creatinine                  0 78              0 60 - 1 30 *       Glucose, Fasting            86                65 - 99 mg/dL       Calcium                     9 0               8 3 - 10 1 m*       AST                         35                5 - 45 U/L          ALT                         58                12 - 78 U/L         Alkaline Phosphatase        76                46 - 116 U/L        Total Protein               7 2               6 4 - 8 2 g/*       Albumin                     3 7               3 5 - 5 0 g/*       Total Bilirubin             0 42              0 20 - 1 00 *       eGFR                        81                ml/min/1 73s*        The following portions of the patient's history were reviewed and updated as appropriate: allergies, current medications, past family history, past medical history, past social history, past surgical history and problem list       Review of Systems   Constitutional: Negative  HENT: Negative  Eyes: Negative  Respiratory: Negative  Cardiovascular: Negative  Gastrointestinal: Negative  Endocrine: Negative  Genitourinary: Negative  Musculoskeletal: Negative  Skin: Negative  Allergic/Immunologic: Negative  Neurological: Negative  Hematological: Negative  Psychiatric/Behavioral: Negative  Data to review:       Objective:    Vitals:    06/24/22 1428   BP: 128/70   BP Location: Right arm   Patient Position: Sitting   Cuff Size: Large   Pulse: 82   SpO2: 98%   Weight: 72 6 kg (160 lb)   Height: 5' 5" (1 651 m)        Physical Exam  Vitals and nursing note reviewed  Constitutional:       Appearance: Normal appearance  She is well-developed  HENT:      Head: Normocephalic and atraumatic  Eyes:      General: Lids are normal       Conjunctiva/sclera: Conjunctivae normal       Pupils: Pupils are equal, round, and reactive to light  Cardiovascular:      Rate and Rhythm: Normal rate and regular rhythm  Heart sounds: No murmur heard  Pulmonary:      Effort: Pulmonary effort is normal       Breath sounds: Normal breath sounds  Skin:     General: Skin is warm and dry  Neurological:      General: No focal deficit present  Mental Status: She is alert  Coordination: Coordination is intact  Psychiatric:         Mood and Affect: Mood normal          Behavior: Behavior normal  Behavior is cooperative  Thought Content: Thought content normal          Judgment: Judgment normal            BMI Counseling: Body mass index is 26 63 kg/m²   The BMI is above normal  Nutrition recommendations include decreasing portion sizes, encouraging healthy choices of fruits and vegetables, decreasing fast food intake, consuming healthier snacks, limiting drinks that contain sugar, moderation in carbohydrate intake, increasing intake of lean protein, reducing intake of saturated and trans fat and reducing intake of cholesterol  Exercise recommendations include exercising 3-5 times per week  No pharmacotherapy was ordered  Rationale for BMI follow-up plan is due to patient being overweight or obese  Depression Screening and Follow-up Plan: Patient was screened for depression during today's encounter  They screened negative with a PHQ-2 score of 0

## 2022-06-24 NOTE — ASSESSMENT & PLAN NOTE
Patient is on Lipitor 40 mg once daily keeping her LDL good enough for her at 116  continue recheck 6 months

## 2022-06-24 NOTE — PATIENT INSTRUCTIONS
Problem List Items Addressed This Visit          Musculoskeletal and Integument    Age-related osteoporosis without current pathological fracture     Continue with endocrinology and Prolia  Other    Anxiety     Stable on Prozac and very infrequent Xanax  Mixed hyperlipidemia - Primary     Patient is on Lipitor 40 mg once daily keeping her LDL good enough for her at 116  continue recheck 6 months  Vitamin D deficiency     Check vitamin-D with next labs             RESOLVED: Elevated LFTs          Other Visit Diagnoses       Encounter for screening mammogram for malignant neoplasm of breast

## 2022-07-28 ENCOUNTER — OFFICE VISIT (OUTPATIENT)
Dept: ENDOCRINOLOGY | Facility: CLINIC | Age: 62
End: 2022-07-28
Payer: COMMERCIAL

## 2022-07-28 VITALS
WEIGHT: 159.2 LBS | BODY MASS INDEX: 26.52 KG/M2 | DIASTOLIC BLOOD PRESSURE: 88 MMHG | SYSTOLIC BLOOD PRESSURE: 138 MMHG | HEART RATE: 91 BPM | HEIGHT: 65 IN

## 2022-07-28 DIAGNOSIS — M81.0 AGE-RELATED OSTEOPOROSIS WITHOUT CURRENT PATHOLOGICAL FRACTURE: Primary | ICD-10-CM

## 2022-07-28 DIAGNOSIS — E55.9 VITAMIN D DEFICIENCY: ICD-10-CM

## 2022-07-28 PROCEDURE — 99213 OFFICE O/P EST LOW 20 MIN: CPT

## 2022-07-28 NOTE — PROGRESS NOTES
Established Patient Progress Note      Chief Complaint   Patient presents with    Osteoporosis        History of Present Illness:   Lala Medina is a 60 y o  female with osteoporosis, vitamin-D deficiency and hyperlipidemia  For osteoporosis she is on Prolia  DEXA scan done 6/2021 showed worsening of bone density, in which Prolia injections were started 7/2021  She is due for Prolia, PA needs to be completed with insurance  For vitamin-D deficiency, taking 2,000 units daily  She gets calcium through her diet  Not currently exercising  Denies recent fracture, falls, kidney stones, or jaw pain  Patient Active Problem List   Diagnosis    Allergic rhinitis    Anxiety    Mixed hyperlipidemia    Vitamin D deficiency    Age-related osteoporosis without current pathological fracture    Asymptomatic menopause    Primary insomnia      Past Medical History:   Diagnosis Date    Anxiety     Hypercholesteremia       History reviewed  No pertinent surgical history     Family History   Problem Relation Age of Onset    Heart attack Mother     Arthritis Mother     Diabetes Mother     Hypertension Mother     Hypothyroidism Mother     Aneurysm Father         Abd Aorta    Heart disease Father         CABG    Anxiety disorder Paternal Grandmother     No Known Problems Maternal Grandmother     No Known Problems Maternal Grandfather     No Known Problems Paternal Grandfather     Lung cancer Paternal Aunt         age unknown     Social History     Tobacco Use    Smoking status: Never Smoker    Smokeless tobacco: Never Used   Substance Use Topics    Alcohol use: Yes     Comment: social     Allergies   Allergen Reactions    Augmentin [Amoxicillin-Pot Clavulanate] Abdominal Pain    Penicillins GI Intolerance         Current Outpatient Medications:     ALPRAZolam (XANAX) 0 5 mg tablet, Take 1 tablet (0 5 mg total) by mouth 2 (two) times a day as needed for anxiety, Disp: 30 tablet, Rfl: 0    aspirin 81 MG tablet, Take 1 tablet by mouth daily, Disp: , Rfl:     atorvastatin (LIPITOR) 40 mg tablet, TAKE 1 TABLET DAILY, Disp: 90 tablet, Rfl: 3    Cholecalciferol 50 MCG (2000 UT) CAPS, Take 1 capsule by mouth daily, Disp: , Rfl:     Cyanocobalamin (VITAMIN B12 PO), Take by mouth, Disp: , Rfl:     FLUoxetine (PROzac) 40 MG capsule, TAKE 1 CAPSULE DAILY, Disp: 90 capsule, Rfl: 3    Multiple Vitamin (DAILY VALUE MULTIVITAMIN) TABS, Take 1 tablet by mouth daily, Disp: , Rfl:     Omega-3 Fatty Acids (FISH OIL) 600 MG CAPS, Take by mouth, Disp: , Rfl:     vitamin E, tocopherol, 400 units capsule, Take by mouth, Disp: , Rfl:     Current Facility-Administered Medications:     denosumab (PROLIA) subcutaneous injection 60 mg, 60 mg, Subcutaneous, Q6 Months, Franklin Garner MD, 60 mg at 01/31/22 1527    Review of Systems   Constitutional: Negative for activity change, appetite change, chills, diaphoresis, fatigue, fever and unexpected weight change  Respiratory: Negative for chest tightness and shortness of breath  Cardiovascular: Negative for chest pain, palpitations and leg swelling  Gastrointestinal: Negative for abdominal pain, constipation, diarrhea, nausea and vomiting  Neurological: Negative for dizziness, tremors, weakness and numbness  All other systems reviewed and are negative  Physical Exam:  Body mass index is 26 49 kg/m²  /88   Pulse 91   Ht 5' 5" (1 651 m)   Wt 72 2 kg (159 lb 3 2 oz)   BMI 26 49 kg/m²    Wt Readings from Last 3 Encounters:   07/28/22 72 2 kg (159 lb 3 2 oz)   06/24/22 72 6 kg (160 lb)   01/31/22 72 6 kg (160 lb)       Physical Exam  Vitals reviewed  Constitutional:       Appearance: Normal appearance  HENT:      Head: Normocephalic  Cardiovascular:      Rate and Rhythm: Normal rate  Pulses: Normal pulses  Heart sounds: Normal heart sounds  No murmur heard  Pulmonary:      Effort: Pulmonary effort is normal  No respiratory distress        Breath sounds: Normal breath sounds  No wheezing, rhonchi or rales  Neurological:      Mental Status: She is alert and oriented to person, place, and time  Psychiatric:         Mood and Affect: Mood normal          Behavior: Behavior normal          Thought Content: Thought content normal          Judgment: Judgment normal          Labs:   No results found for: HGBA1C  Lab Results   Component Value Date    CREATININE 0 78 06/21/2022    CREATININE 0 85 12/10/2021    CREATININE 0 75 05/18/2021    BUN 13 06/21/2022     02/26/2015    K 3 8 06/21/2022     (H) 06/21/2022    CO2 25 06/21/2022     eGFR   Date Value Ref Range Status   06/21/2022 81 ml/min/1 73sq m Final     Lab Results   Component Value Date    CHOL 215 02/26/2015    HDL 61 06/21/2022    TRIG 97 06/21/2022     Lab Results   Component Value Date    ALT 58 06/21/2022    AST 35 06/21/2022    ALKPHOS 76 06/21/2022    BILITOT 0 53 02/26/2015     Lab Results   Component Value Date    [id number] 3 440 07/31/2019     Lab Results   Component Value Date    FREET4 0 99 07/31/2019       Impression & Plan:    Problem List Items Addressed This Visit        Musculoskeletal and Integument    Age-related osteoporosis without current pathological fracture - Primary     Continue Prolia injections  Will call to schedule once we complete PA  Continue calcium through diet and vitamin d supplements  Would recommend regular exercise  Other    Vitamin D deficiency     Continue supplementation  No orders of the defined types were placed in this encounter  There are no Patient Instructions on file for this visit  Discussed with the patient and all questioned fully answered  She will call me if any problems arise      DAVON Lockett

## 2022-07-28 NOTE — ASSESSMENT & PLAN NOTE
Continue Prolia injections  Will call to schedule once we complete PA  Continue calcium through diet and vitamin d supplements  Would recommend regular exercise

## 2022-07-29 ENCOUNTER — TELEPHONE (OUTPATIENT)
Dept: ENDOCRINOLOGY | Facility: CLINIC | Age: 62
End: 2022-07-29

## 2022-07-29 NOTE — TELEPHONE ENCOUNTER
VIA [facility] PA submitted via [employer] for [employer]      Reference # [id number]

## 2022-08-10 ENCOUNTER — CLINICAL SUPPORT (OUTPATIENT)
Dept: ENDOCRINOLOGY | Facility: CLINIC | Age: 62
End: 2022-08-10
Payer: COMMERCIAL

## 2022-08-10 DIAGNOSIS — M81.0 AGE-RELATED OSTEOPOROSIS WITHOUT CURRENT PATHOLOGICAL FRACTURE: ICD-10-CM

## 2022-08-10 PROCEDURE — 96372 THER/PROPH/DIAG INJ SC/IM: CPT

## 2022-08-29 ENCOUNTER — HOSPITAL ENCOUNTER (OUTPATIENT)
Dept: MAMMOGRAPHY | Facility: MEDICAL CENTER | Age: 62
Discharge: HOME | End: 2022-08-29
Payer: COMMERCIAL

## 2022-08-29 VITALS — HEIGHT: 65 IN | WEIGHT: 155 LBS | BODY MASS INDEX: 25.83 KG/M2

## 2022-08-29 DIAGNOSIS — Z12.31 ENCOUNTER FOR SCREENING MAMMOGRAM FOR MALIGNANT NEOPLASM OF BREAST: ICD-10-CM

## 2022-08-29 PROCEDURE — 77063 BREAST TOMOSYNTHESIS BI: CPT

## 2022-08-29 PROCEDURE — 77067 SCR MAMMO BI INCL CAD: CPT

## 2022-12-21 ENCOUNTER — APPOINTMENT (OUTPATIENT)
Dept: LAB | Facility: MEDICAL CENTER | Age: 62
End: 2022-12-21

## 2022-12-21 DIAGNOSIS — E55.9 VITAMIN D DEFICIENCY: ICD-10-CM

## 2022-12-21 DIAGNOSIS — E78.2 MIXED HYPERLIPIDEMIA: ICD-10-CM

## 2022-12-21 LAB
25(OH)D3 SERPL-MCNC: 43.6 NG/ML (ref 30–100)
ALBUMIN SERPL BCP-MCNC: 3.7 G/DL (ref 3.5–5)
ALP SERPL-CCNC: 78 U/L (ref 46–116)
ALT SERPL W P-5'-P-CCNC: 28 U/L (ref 12–78)
ANION GAP SERPL CALCULATED.3IONS-SCNC: 4 MMOL/L (ref 4–13)
AST SERPL W P-5'-P-CCNC: 17 U/L (ref 5–45)
BILIRUB SERPL-MCNC: 0.63 MG/DL (ref 0.2–1)
BUN SERPL-MCNC: 16 MG/DL (ref 5–25)
CALCIUM SERPL-MCNC: 8.9 MG/DL (ref 8.3–10.1)
CHLORIDE SERPL-SCNC: 108 MMOL/L (ref 96–108)
CHOLEST SERPL-MCNC: 201 MG/DL
CO2 SERPL-SCNC: 28 MMOL/L (ref 21–32)
CREAT SERPL-MCNC: 0.72 MG/DL (ref 0.6–1.3)
GFR SERPL CREATININE-BSD FRML MDRD: 89 ML/MIN/1.73SQ M
GLUCOSE P FAST SERPL-MCNC: 91 MG/DL (ref 65–99)
HDLC SERPL-MCNC: 65 MG/DL
LDLC SERPL CALC-MCNC: 114 MG/DL (ref 0–100)
POTASSIUM SERPL-SCNC: 3.7 MMOL/L (ref 3.5–5.3)
PROT SERPL-MCNC: 7.3 G/DL (ref 6.4–8.4)
SODIUM SERPL-SCNC: 140 MMOL/L (ref 135–147)
TRIGL SERPL-MCNC: 109 MG/DL

## 2023-01-10 ENCOUNTER — OFFICE VISIT (OUTPATIENT)
Dept: FAMILY MEDICINE CLINIC | Facility: CLINIC | Age: 63
End: 2023-01-10

## 2023-01-10 VITALS
OXYGEN SATURATION: 97 % | DIASTOLIC BLOOD PRESSURE: 70 MMHG | WEIGHT: 158.2 LBS | BODY MASS INDEX: 26.36 KG/M2 | SYSTOLIC BLOOD PRESSURE: 100 MMHG | HEART RATE: 85 BPM | HEIGHT: 65 IN

## 2023-01-10 DIAGNOSIS — E78.2 MIXED HYPERLIPIDEMIA: ICD-10-CM

## 2023-01-10 DIAGNOSIS — M81.0 AGE-RELATED OSTEOPOROSIS WITHOUT CURRENT PATHOLOGICAL FRACTURE: ICD-10-CM

## 2023-01-10 DIAGNOSIS — E55.9 VITAMIN D DEFICIENCY: Primary | ICD-10-CM

## 2023-01-10 DIAGNOSIS — F41.9 ANXIETY: ICD-10-CM

## 2023-01-10 NOTE — ASSESSMENT & PLAN NOTE
Patient is on Lipitor 40 mg once daily her LDL is 114 which is about normal for the patient on the strength of the medication  At this time I do not see the need to increase Lipitor as she does not have heart disease or diabetes continue recheck 6 months

## 2023-01-10 NOTE — PROGRESS NOTES
Name: Liliana Elliott      : 1960      MRN: [id number]  Encounter Provider: Vika Benz PA-C  Encounter Date: 1/10/2023   Encounter department: [facility] PRIMARY CARE    Assessment & Plan     1  Vitamin D deficiency  Assessment & Plan:  Vitamin D levels normal at 43 continue supplementation check yearly  2  Mixed hyperlipidemia  Assessment & Plan:  Patient is on Lipitor 40 mg once daily her LDL is 114 which is about normal for the patient on the strength of the medication  At this time I do not see the need to increase Lipitor as she does not have heart disease or diabetes continue recheck 6 months  Orders:  -     Comprehensive metabolic panel; Future; Expected date: 07/10/2023  -     Lipid Panel with Direct LDL reflex; Future; Expected date: 07/10/2023    3  Anxiety  Assessment & Plan:  Stable on Prozac  4  Age-related osteoporosis without current pathological fracture  Assessment & Plan:  Following with endocrinology on Prolia  BMI Counseling: Body mass index is 26 33 kg/m²  The BMI is above normal  Nutrition recommendations include decreasing portion sizes, encouraging healthy choices of fruits and vegetables, decreasing fast food intake, consuming healthier snacks, limiting drinks that contain sugar, moderation in carbohydrate intake, increasing intake of lean protein, reducing intake of saturated and trans fat and reducing intake of cholesterol  Exercise recommendations include exercising 3-5 times per week  No pharmacotherapy was ordered  Rationale for BMI follow-up plan is due to patient being overweight or obese  Depression Screening and Follow-up Plan: Patient was screened for depression during today's encounter  They screened negative with a PHQ-2 score of 0  Subjective        Liliana Elliott is here for chronic conditions f/u including the diagnosis of No diagnosis found     Pt  states they are taking all medications as directed without complaints or side effects  Pt  had labs done prior to today's visit which included Recent Results (from the past 672 hour(s))  -Vitamin D 25 hydroxy:   Collection Time: 12/21/22 12:49 PM       Result                      Value             Ref Range           Vit D, 25-Hydroxy           43 6              30 0 - 100 0*  -Lipid Panel with Direct LDL reflex:   Collection Time: 12/21/22 12:49 PM       Result                      Value             Ref Range           Cholesterol                 201 (H)           See Comment *       Triglycerides               109               See Comment *       HDL, Direct                 65                >=50 mg/dL          LDL Calculated              114 (H)           0 - 100 mg/dL  -Comprehensive metabolic panel:   Collection Time: 12/21/22 12:49 PM       Result                      Value             Ref Range           Sodium                      140               135 - 147 mm*       Potassium                   3 7               3 5 - 5 3 mm*       Chloride                    108               96 - 108 mmo*       CO2                         28                21 - 32 mmol*       ANION GAP                   4                 4 - 13 mmol/L       BUN                         16                5 - 25 mg/dL        Creatinine                  0 72              0 60 - 1 30 *       Glucose, Fasting            91                65 - 99 mg/dL       Calcium                     8 9               8 3 - 10 1 m*       AST                         17                5 - 45 U/L          ALT                         28                12 - 78 U/L         Alkaline Phosphatase        78                46 - 116 U/L        Total Protein               7 3               6 4 - 8 4 g/*       Albumin                     3 7               3 5 - 5 0 g/*       Total Bilirubin             0 63              0 20 - 1 00 *       eGFR                        89                ml/min/1 73s*      Review of Systems   Constitutional: Negative  HENT: Negative  Eyes: Negative  Respiratory: Negative  Cardiovascular: Negative  Gastrointestinal: Negative  Endocrine: Negative  Genitourinary: Negative  Musculoskeletal: Negative  Skin: Negative  Allergic/Immunologic: Negative  Neurological: Negative  Hematological: Negative  Psychiatric/Behavioral: Negative  Current Outpatient Medications on File Prior to Visit   Medication Sig   • aspirin 81 MG tablet Take 1 tablet by mouth daily   • atorvastatin (LIPITOR) 40 mg tablet TAKE 1 TABLET DAILY   • Cholecalciferol 50 MCG (2000 UT) CAPS Take 1 capsule by mouth daily   • Cyanocobalamin (VITAMIN B12 PO) Take by mouth   • FLUoxetine (PROzac) 40 MG capsule TAKE 1 CAPSULE DAILY   • Multiple Vitamin (DAILY VALUE MULTIVITAMIN) TABS Take 1 tablet by mouth daily   • Omega-3 Fatty Acids (FISH OIL) 600 MG CAPS Take by mouth   • vitamin E, tocopherol, 400 units capsule Take by mouth   • ALPRAZolam (XANAX) 0 5 mg tablet Take 1 tablet (0 5 mg total) by mouth 2 (two) times a day as needed for anxiety       Objective     /70 (BP Location: Left arm, Patient Position: Sitting, Cuff Size: Standard)   Pulse 85   Ht 5' 5" (1 651 m)   Wt 71 8 kg (158 lb 3 2 oz)   SpO2 97%   BMI 26 33 kg/m²     Physical Exam  Vitals and nursing note reviewed  Constitutional:       General: She is not in acute distress  Appearance: She is well-developed  She is not diaphoretic  HENT:      Head: Normocephalic and atraumatic  Eyes:      General:         Right eye: No discharge  Left eye: No discharge  Conjunctiva/sclera: Conjunctivae normal    Neck:      Vascular: No carotid bruit  Cardiovascular:      Rate and Rhythm: Normal rate and regular rhythm  Heart sounds: Normal heart sounds  No murmur heard  No friction rub  No gallop  Pulmonary:      Effort: Pulmonary effort is normal  No respiratory distress  Breath sounds: Normal breath sounds  No wheezing or rales  Musculoskeletal:      Cervical back: Neck supple  Skin:     General: Skin is warm and dry  Neurological:      Mental Status: She is alert and oriented to person, place, and time     Psychiatric:         Judgment: Judgment normal        Valorie Berg PA-C

## 2023-02-02 ENCOUNTER — OFFICE VISIT (OUTPATIENT)
Dept: ENDOCRINOLOGY | Facility: CLINIC | Age: 63
End: 2023-02-02

## 2023-02-02 VITALS
BODY MASS INDEX: 25.85 KG/M2 | SYSTOLIC BLOOD PRESSURE: 130 MMHG | HEART RATE: 68 BPM | WEIGHT: 155.13 LBS | DIASTOLIC BLOOD PRESSURE: 68 MMHG | HEIGHT: 65 IN

## 2023-02-02 DIAGNOSIS — M81.0 AGE-RELATED OSTEOPOROSIS WITHOUT CURRENT PATHOLOGICAL FRACTURE: Primary | ICD-10-CM

## 2023-02-02 NOTE — ASSESSMENT & PLAN NOTE
She is due for next Prolia injection after 2/10/23  She will schedule this  Continue with vitamin d supplementation as well as dietary calcium  Reviewed fall prevention  DEXA scan will be due in June - this has been ordered today

## 2023-02-02 NOTE — PROGRESS NOTES
Established Patient Progress Note      Chief Complaint   Patient presents with   • Osteoporosis        History of Present Illness:   Talita Umana is a 60 y o  female with osteoporosis, vitamin-D deficiency and hyperlipidemia  For osteoporosis she is on Prolia  DEXA scan done 6/2021 showed worsening of bone density, in which Prolia injections were started 7/2021  Last injection was 8/10/22  For vitamin-D deficiency, taking 2,000 units daily  She gets calcium through her diet  Not currently exercising  Denies recent fracture, falls, kidney stones, or jaw pain  Patient Active Problem List   Diagnosis   • Allergic rhinitis   • Anxiety   • Mixed hyperlipidemia   • Vitamin D deficiency   • Age-related osteoporosis without current pathological fracture   • Asymptomatic menopause   • Primary insomnia      Past Medical History:   Diagnosis Date   • Anxiety    • Hypercholesteremia       History reviewed  No pertinent surgical history  Family History   Problem Relation Age of Onset   • Heart attack Mother    • Arthritis Mother    • Diabetes Mother    • Hypertension Mother    • Hypothyroidism Mother    • Diabetes type I Mother    • Aneurysm Father         Abd Aorta   • Heart disease Father         CABG   • Anxiety disorder Paternal Grandmother    • No Known Problems Maternal Grandmother    • No Known Problems Maternal Grandfather    • No Known Problems Paternal Grandfather    • Lung cancer Paternal Aunt         age unknown     Social History     Tobacco Use   • Smoking status: Never   • Smokeless tobacco: Never   Substance Use Topics   • Alcohol use:  Yes     Alcohol/week: 1 0 standard drink     Types: 1 Glasses of wine per week     Comment: social     Allergies   Allergen Reactions   • Augmentin [Amoxicillin-Pot Clavulanate] Abdominal Pain   • Penicillins GI Intolerance         Current Outpatient Medications:   •  ALPRAZolam (XANAX) 0 5 mg tablet, Take 1 tablet (0 5 mg total) by mouth 2 (two) times a day as needed for anxiety, Disp: 30 tablet, Rfl: 0  •  aspirin 81 MG tablet, Take 1 tablet by mouth daily, Disp: , Rfl:   •  atorvastatin (LIPITOR) 40 mg tablet, TAKE 1 TABLET DAILY, Disp: 90 tablet, Rfl: 3  •  Cholecalciferol 50 MCG (2000 UT) CAPS, Take 1 capsule by mouth daily, Disp: , Rfl:   •  Cyanocobalamin (VITAMIN B12 PO), Take by mouth, Disp: , Rfl:   •  FLUoxetine (PROzac) 40 MG capsule, TAKE 1 CAPSULE DAILY, Disp: 90 capsule, Rfl: 3  •  Multiple Vitamin (DAILY VALUE MULTIVITAMIN) TABS, Take 1 tablet by mouth daily, Disp: , Rfl:   •  Omega-3 Fatty Acids (FISH OIL) 600 MG CAPS, Take by mouth, Disp: , Rfl:   •  vitamin E, tocopherol, 400 units capsule, Take by mouth, Disp: , Rfl:     Current Facility-Administered Medications:   •  denosumab (PROLIA) subcutaneous injection 60 mg, 60 mg, Subcutaneous, Q6 Months, Franklin Garner MD, 60 mg at 08/10/22 1245    Review of Systems   Constitutional: Negative for activity change, appetite change, chills, diaphoresis, fatigue, fever and unexpected weight change  Respiratory: Negative for shortness of breath  Cardiovascular: Negative for chest pain, palpitations and leg swelling  Musculoskeletal: Negative for arthralgias, back pain and gait problem  Neurological: Positive for light-headedness (at times when standing too fast)  Negative for dizziness and weakness  All other systems reviewed and are negative  Physical Exam:  Body mass index is 25 73 kg/m²  /68 (BP Location: Left arm, Patient Position: Sitting, Cuff Size: Large)   Pulse 68   Ht 5' 5 1" (1 654 m)   Wt 70 4 kg (155 lb 2 oz)   BMI 25 73 kg/m²    Wt Readings from Last 3 Encounters:   02/02/23 70 4 kg (155 lb 2 oz)   01/10/23 71 8 kg (158 lb 3 2 oz)   08/29/22 70 3 kg (155 lb)       Physical Exam  Vitals reviewed  Constitutional:       Appearance: Normal appearance  Cardiovascular:      Rate and Rhythm: Normal rate and regular rhythm  Pulses: Normal pulses        Heart sounds: Normal heart sounds  No murmur heard  Neurological:      Mental Status: She is alert and oriented to person, place, and time  Psychiatric:         Mood and Affect: Mood normal          Behavior: Behavior normal          Thought Content: Thought content normal          Judgment: Judgment normal          Labs:   Lab Results   Component Value Date    CREATININE 0 72 12/21/2022    CREATININE 0 78 06/21/2022    CREATININE 0 85 12/10/2021    BUN 16 12/21/2022     02/26/2015    K 3 7 12/21/2022     12/21/2022    CO2 28 12/21/2022     eGFR   Date Value Ref Range Status   12/21/2022 89 ml/min/1 73sq m Final     Lab Results   Component Value Date    CHOL 215 02/26/2015    HDL 65 12/21/2022    TRIG 109 12/21/2022     Lab Results   Component Value Date    ALT 28 12/21/2022    AST 17 12/21/2022    ALKPHOS 78 12/21/2022    BILITOT 0 53 02/26/2015     Lab Results   Component Value Date    WWN8BPNJGLQC 3 440 07/31/2019     Lab Results   Component Value Date    FREET4 0 99 07/31/2019       Impression & Plan:    Problem List Items Addressed This Visit        Musculoskeletal and Integument    Age-related osteoporosis without current pathological fracture - Primary     She is due for next Prolia injection after 2/10/23  She will schedule this  Continue with vitamin d supplementation as well as dietary calcium  Reviewed fall prevention  DEXA scan will be due in June - this has been ordered today  Relevant Orders    DXA bone density spine hip and pelvis       Orders Placed This Encounter   Procedures   • DXA bone density spine hip and pelvis     Standing Status:   Future     Standing Expiration Date:   2/2/2027     Scheduling Instructions:      Please wear comfortable clothing with no metal buttons, zippers, snaps or an underwire bra  Do not take any calcium supplements 24 hours prior to your test  Please bring your insurance cards, a form of photo ID and a list of your medications with you   Arrive 5-10 minutes prior to your appointment time in order to register  Your study cannot be performed if you take your calcium supplement 24 hours before the scheduled Dexa scan examination  To schedule this appointment, please contact Central Scheduling at [phone]  There are no Patient Instructions on file for this visit  Discussed with the patient and all questioned fully answered  She will call me if any problems arise      DAVON Chi

## 2023-02-03 ENCOUNTER — TELEPHONE (OUTPATIENT)
Dept: ENDOCRINOLOGY | Facility: CLINIC | Age: 63
End: 2023-02-03

## 2023-02-08 ENCOUNTER — TELEPHONE (OUTPATIENT)
Dept: ENDOCRINOLOGY | Facility: CLINIC | Age: 63
End: 2023-02-08

## 2023-02-08 NOTE — TELEPHONE ENCOUNTER
Verified via [employer] portal that current PA is still active  PA # [id number], Eff 7/29/2022 - 7/28/2023, Quantity # 60 units remaining    Insurance verification submitted for year 2023

## 2023-02-13 ENCOUNTER — CLINICAL SUPPORT (OUTPATIENT)
Dept: ENDOCRINOLOGY | Facility: CLINIC | Age: 63
End: 2023-02-13

## 2023-02-13 DIAGNOSIS — M81.0 AGE-RELATED OSTEOPOROSIS WITHOUT CURRENT PATHOLOGICAL FRACTURE: ICD-10-CM

## 2023-02-13 NOTE — PROGRESS NOTES
Assessment/Plan:    Gi Orourke came into the [address] [place] [address]'s Endocrinology Office today 02/13/23 to receive Prolia  injection  Verbal consent obtained  Consent given by: patient    patient states patient has been medically healthy with no underlining concerns/complications  Gi Orourke presents with no symptoms today  All insturctions were reviewed with the patient  If the patient should have any questions/concerns, advised patient to contacted [facility]  Subjective:     History provided by: patient    Patient ID: Gi Orourke is a 60 y o  female      Objective: There were no vitals filed for this visit  Patient tolerated the injection well without any complications  Injection site/s left arm   Medication was provided by the office  Patient signed consent form no   Patient signed Bethanie Amara form no (If no patient is not a medicare patient)  Patient waited 15 minutes after injection no (This only applies to patient's receiving first time injection)         Last Prolia injection: 08/10/2022  NextProlia injection : 08/14/2023

## 2023-02-17 NOTE — TELEPHONE ENCOUNTER
Verification received  PA required and on file  OOP cost for Prolia is 10% and admin fee $50  Pt has commercial insurance and can use copay card

## 2023-05-22 DIAGNOSIS — F32.89 OTHER DEPRESSION: ICD-10-CM

## 2023-05-22 DIAGNOSIS — E78.2 MIXED HYPERLIPIDEMIA: ICD-10-CM

## 2023-05-22 RX ORDER — FLUOXETINE HYDROCHLORIDE 40 MG/1
CAPSULE ORAL
Qty: 90 CAPSULE | Refills: 3 | Status: SHIPPED | OUTPATIENT
Start: 2023-05-22

## 2023-05-22 RX ORDER — ATORVASTATIN CALCIUM 40 MG/1
TABLET, FILM COATED ORAL
Qty: 90 TABLET | Refills: 3 | Status: SHIPPED | OUTPATIENT
Start: 2023-05-22

## 2023-06-12 ENCOUNTER — HOSPITAL ENCOUNTER (OUTPATIENT)
Dept: BONE DENSITY | Facility: MEDICAL CENTER | Age: 63
Discharge: HOME | End: 2023-06-12
Payer: COMMERCIAL

## 2023-06-12 DIAGNOSIS — M81.0 AGE-RELATED OSTEOPOROSIS WITHOUT CURRENT PATHOLOGICAL FRACTURE: ICD-10-CM

## 2023-06-12 PROCEDURE — 77080 DXA BONE DENSITY AXIAL: CPT

## 2023-07-01 DIAGNOSIS — F41.9 ANXIETY: ICD-10-CM

## 2023-07-04 RX ORDER — ALPRAZOLAM 0.5 MG/1
0.5 TABLET ORAL 2 TIMES DAILY PRN
Qty: 30 TABLET | Refills: 0 | Status: SHIPPED | OUTPATIENT
Start: 2023-07-04 | End: 2023-08-03

## 2023-07-18 ENCOUNTER — APPOINTMENT (OUTPATIENT)
Dept: LAB | Facility: MEDICAL CENTER | Age: 63
End: 2023-07-18
Payer: COMMERCIAL

## 2023-07-18 DIAGNOSIS — E78.2 MIXED HYPERLIPIDEMIA: ICD-10-CM

## 2023-07-18 LAB
ALBUMIN SERPL BCP-MCNC: 3.7 G/DL (ref 3.5–5)
ALP SERPL-CCNC: 78 U/L (ref 46–116)
ALT SERPL W P-5'-P-CCNC: 40 U/L (ref 12–78)
ANION GAP SERPL CALCULATED.3IONS-SCNC: 3 MMOL/L
AST SERPL W P-5'-P-CCNC: 30 U/L (ref 5–45)
BILIRUB SERPL-MCNC: 0.61 MG/DL (ref 0.2–1)
BUN SERPL-MCNC: 11 MG/DL (ref 5–25)
CALCIUM SERPL-MCNC: 9 MG/DL (ref 8.3–10.1)
CHLORIDE SERPL-SCNC: 110 MMOL/L (ref 96–108)
CHOLEST SERPL-MCNC: 211 MG/DL
CO2 SERPL-SCNC: 26 MMOL/L (ref 21–32)
CREAT SERPL-MCNC: 0.82 MG/DL (ref 0.6–1.3)
GFR SERPL CREATININE-BSD FRML MDRD: 75 ML/MIN/1.73SQ M
GLUCOSE P FAST SERPL-MCNC: 86 MG/DL (ref 65–99)
HDLC SERPL-MCNC: 73 MG/DL
LDLC SERPL CALC-MCNC: 105 MG/DL (ref 0–100)
POTASSIUM SERPL-SCNC: 3.4 MMOL/L (ref 3.5–5.3)
PROT SERPL-MCNC: 7 G/DL (ref 6.4–8.4)
SODIUM SERPL-SCNC: 139 MMOL/L (ref 135–147)
TRIGL SERPL-MCNC: 163 MG/DL

## 2023-07-18 PROCEDURE — 80061 LIPID PANEL: CPT

## 2023-07-18 PROCEDURE — 80053 COMPREHEN METABOLIC PANEL: CPT

## 2023-07-18 PROCEDURE — 36415 COLL VENOUS BLD VENIPUNCTURE: CPT

## 2023-07-21 ENCOUNTER — OFFICE VISIT (OUTPATIENT)
Dept: FAMILY MEDICINE CLINIC | Facility: CLINIC | Age: 63
End: 2023-07-21
Payer: COMMERCIAL

## 2023-07-21 VITALS
DIASTOLIC BLOOD PRESSURE: 70 MMHG | SYSTOLIC BLOOD PRESSURE: 116 MMHG | HEART RATE: 79 BPM | BODY MASS INDEX: 27.16 KG/M2 | OXYGEN SATURATION: 93 % | HEIGHT: 65 IN | WEIGHT: 163 LBS

## 2023-07-21 DIAGNOSIS — E55.9 VITAMIN D DEFICIENCY: ICD-10-CM

## 2023-07-21 DIAGNOSIS — Z12.31 ENCOUNTER FOR SCREENING MAMMOGRAM FOR MALIGNANT NEOPLASM OF BREAST: ICD-10-CM

## 2023-07-21 DIAGNOSIS — Z00.00 HEALTHCARE MAINTENANCE: ICD-10-CM

## 2023-07-21 DIAGNOSIS — J30.2 SEASONAL ALLERGIC RHINITIS, UNSPECIFIED TRIGGER: ICD-10-CM

## 2023-07-21 DIAGNOSIS — E78.2 MIXED HYPERLIPIDEMIA: Primary | ICD-10-CM

## 2023-07-21 DIAGNOSIS — F41.9 ANXIETY: ICD-10-CM

## 2023-07-21 DIAGNOSIS — M81.0 AGE-RELATED OSTEOPOROSIS WITHOUT CURRENT PATHOLOGICAL FRACTURE: ICD-10-CM

## 2023-07-21 DIAGNOSIS — Z12.11 COLON CANCER SCREENING: ICD-10-CM

## 2023-07-21 PROCEDURE — 99214 OFFICE O/P EST MOD 30 MIN: CPT | Performed by: PHYSICIAN ASSISTANT

## 2023-07-21 NOTE — PROGRESS NOTES
Name: Jocelyn Scales      : 1960      MRN: [id number]  Encounter Provider: Suresh Cornelius PA-C  Encounter Date: 2023   Encounter department: [facility]    Assessment & Plan     1. Mixed hyperlipidemia  Assessment & Plan:  Patient on Lipitor 40 keeping LDL near goal at 105 which is improved from 114. Triglycerides slightly elevated at 163 however HDL good at 73 continue recheck 6 months. Orders:  -     Lipid Panel with Direct LDL reflex; Future; Expected date: 2024  -     Comprehensive metabolic panel; Future; Expected date: 2024  -     CBC and differential; Future; Expected date: 2024    2. Anxiety  Assessment & Plan:  Continue with Prozac and as needed Xanax. 3. Vitamin D deficiency  Assessment & Plan:  Check vitamin D with next labs. Orders:  -     Vitamin D 25 hydroxy; Future; Expected date: 2024    4. Age-related osteoporosis without current pathological fracture  Assessment & Plan:  Patient continues with endocrinology and Prolia. Orders:  -     CBC and differential; Future; Expected date: 2024    5. Encounter for screening mammogram for malignant neoplasm of breast  -     Mammo screening bilateral w 3d & cad; Future; Expected date: 2023    6. Healthcare maintenance  -     Ambulatory Referral to Gynecology; Future    7. Colon cancer screening  -     Cologuard    8. Seasonal allergic rhinitis, unspecified trigger  Assessment & Plan:  Stable on Zytrec D. Depression Screening and Follow-up Plan: Patient was screened for depression during today's encounter. They screened negative with a PHQ-2 score of 0. Subjective        Jocelyn Scales is here for chronic conditions f/u.  Pt. had labs done prior to today's visit which included Recent Results (from the past 672 hour(s))  -Comprehensive metabolic panel:   Collection Time: 23 10:46 AM       Result                      Value             Ref Range           Sodium 139               135 - 147 mm*       Potassium                   3.4 (L)           3.5 - 5.3 mm*       Chloride                    110 (H)           96 - 108 mmo*       CO2                         26                21 - 32 mmol*       ANION GAP                   3                 mmol/L              BUN                         11                5 - 25 mg/dL        Creatinine                  0.82              0.60 - 1.30 *       Glucose, Fasting            86                65 - 99 mg/dL       Calcium                     9.0               8.3 - 10.1 m*       AST                         30                5 - 45 U/L          ALT                         40                12 - 78 U/L         Alkaline Phosphatase        78                46 - 116 U/L        Total Protein               7.0               6.4 - 8.4 g/*       Albumin                     3.7               3.5 - 5.0 g/*       Total Bilirubin             0.61              0.20 - 1.00 *       eGFR                        75                ml/min/1.73s*  -Lipid Panel with Direct LDL reflex:   Collection Time: 07/18/23 10:46 AM       Result                      Value             Ref Range           Cholesterol                 211 (H)           See Comment *       Triglycerides               163 (H)           See Comment *       HDL, Direct                 73                >=50 mg/dL          LDL Calculated              105 (H)           0 - 100 mg/dL      Review of Systems   Constitutional: Negative. HENT: Negative. Eyes: Negative. Respiratory: Negative. Cardiovascular: Negative. Gastrointestinal: Negative. Endocrine: Negative. Genitourinary: Negative. Musculoskeletal: Negative. Skin: Negative. Allergic/Immunologic: Negative. Neurological: Negative. Hematological: Negative. Psychiatric/Behavioral: Negative.         Current Outpatient Medications on File Prior to Visit   Medication Sig   • ALPRAZolam Geronimo Renuka) 0.5 mg tablet Take 1 tablet (0.5 mg total) by mouth 2 (two) times a day as needed for anxiety   • aspirin 81 MG tablet Take 1 tablet by mouth daily   • atorvastatin (LIPITOR) 40 mg tablet TAKE 1 TABLET DAILY   • Cholecalciferol 50 MCG (2000 UT) CAPS Take 1 capsule by mouth daily   • Cyanocobalamin (VITAMIN B12 PO) Take by mouth   • FLUoxetine (PROzac) 40 MG capsule TAKE 1 CAPSULE DAILY   • Multiple Vitamin (DAILY VALUE MULTIVITAMIN) TABS Take 1 tablet by mouth daily   • Omega-3 Fatty Acids (FISH OIL) 600 MG CAPS Take by mouth   • vitamin E, tocopherol, 400 units capsule Take by mouth       Objective     /70 (BP Location: Left arm, Patient Position: Sitting, Cuff Size: Standard)   Pulse 79   Ht 5' 5.1" (1.654 m)   Wt 73.9 kg (163 lb)   SpO2 93%   BMI 27.04 kg/m²     Physical Exam  Vitals and nursing note reviewed. Constitutional:       General: She is not in acute distress. Appearance: She is well-developed. She is not diaphoretic. HENT:      Head: Normocephalic and atraumatic. Eyes:      General:         Right eye: No discharge. Left eye: No discharge. Conjunctiva/sclera: Conjunctivae normal.   Neck:      Vascular: No carotid bruit. Cardiovascular:      Rate and Rhythm: Normal rate and regular rhythm. Heart sounds: Normal heart sounds. No murmur heard. No friction rub. No gallop. Pulmonary:      Effort: Pulmonary effort is normal. No respiratory distress. Breath sounds: Normal breath sounds. No wheezing or rales. Musculoskeletal:      Cervical back: Neck supple. Skin:     General: Skin is warm and dry. Neurological:      Mental Status: She is alert and oriented to person, place, and time.    Psychiatric:         Judgment: Judgment normal.       Kodak Solares PA-C

## 2023-07-21 NOTE — PATIENT INSTRUCTIONS
1. Mixed hyperlipidemia  Assessment & Plan:  Patient on Lipitor 40 keeping LDL near goal at 105 which is improved from 114. Triglycerides slightly elevated at 163 however HDL good at 73 continue recheck 6 months. 2. Anxiety  Assessment & Plan:  Continue with Prozac and as needed Xanax. 3. Vitamin D deficiency  Assessment & Plan:  Check vitamin D with next labs. 4. Age-related osteoporosis without current pathological fracture  Assessment & Plan:  Patient continues with endocrinology and Prolia. Potassium Content of Foods List   WHAT YOU NEED TO KNOW:   What is potassium? Potassium is a mineral that is found in most foods. Potassium helps to balance fluids and minerals in your body. It also helps your body maintain a normal blood pressure. Potassium helps your muscles contract and your nerves function normally. Why do I need to change the amount of potassium I eat? You may need more potassium  if you have hypokalemia (low potassium levels) or high blood pressure. You may also need more potassium if you are taking diuretics. Diuretics and certain medicines cause your body to lose potassium. You may need less potassium  in your diet if you have hyperkalemia (high potassium levels) or kidney disease. How much potassium does fruit contain? The amount of potassium in milligrams (mg) contained in each fruit or serving of fruit is listed beside the item.   High-potassium foods (more than 200 mg per serving):      1 medium banana (425)    ½ of a papaya (390)    ½ cup of prune juice (370)    ¼ cup of raisins (270)    1 medium eugenia (325) or kiwi (240)    1 small orange (240) or ½ cup of orange juice (235)    ½ cup of cubed cantaloupe (215) or diced honeydew melon (200)    1 medium pear (200)    Medium-potassium foods (50 to 200 mg per serving):      1 medium peach (185)    1 small apple or ½ cup of apple juice (150)    ½ cup of peaches canned in juice (120)    ½ cup of canned pineapple (100)    ½ cup of fresh, sliced strawberries (105)    ½ cup of watermelon (85)    Low-potassium foods (less than 50 mg per serving):      ½ cup of cranberries (45) or cranberry juice cocktail (20)    ½ cup of nectar of papaya, eugenia, or pear (35)    How much potassium do vegetables contain? High-potassium foods (more than 200 mg per serving):      1 medium baked potato, with skin (925)    1 baked medium sweet potato, with skin (450)    ½ cup of tomato or vegetable juice (275), or 1 medium raw tomato (290)    ½ cup of mushrooms (280)    ½ cup of fresh brussels sprouts (250)    ½ cup of cooked zucchini (220) or winter squash (250)    ¼ of a medium avocado (245)    ½ cup of broccoli (230)    Medium-potassium foods (50 to 200 mg per serving):      ½ cup of corn (195)    ½ cup of fresh or cooked carrots (180)    ½ cup of fresh cauliflower (150)    ½ cup of asparagus (155)    ½ cup of canned peas (90)     1 cup of lettuce, all types (100)    ½ cup of fresh green beans (90)    ½ cup of frozen green beans (85)    ½ cup of cucumber (80)    How much potassium do protein foods contain? High-potassium foods (more than 200 mg per serving):      ½ cup of cooked gonzalez beans (400) or lentils (365)    1 cup of soy milk (300)    3 ounces of baked or broiled salmon (319)    3 ounces of roasted turkey, dark meat (250)    ¼ cup of sunflower seeds (241)    3 ounces of cooked lean beef (224)    2 tablespoons of smooth peanut butter (210)    Medium-potassium foods (50 to 200 mg per serving):      1 ounce of salted peanuts, almonds, or cashews (200)    1 large egg (60)    How much potassium do dairy foods contain?    High-potassium foods (more than 200 mg per serving):      6 ounces of yogurt (260 to 435)    1 cup of nonfat, low-fat, or whole milk (350 to 380)    Medium-potassium foods (50 to 200 mg per serving):      ½ cup of ricotta cheese (154)    ½ cup of vanilla ice cream (131)    ½ cup of low-fat (2%) cottage cheese (110)    Low-potassium foods (less than 50 mg per serving):      1 ounce of cheese (20 to 30)      How much potassium do grains contain? 1 slice of white bread (30)    ½ cup of white or brown rice (50)    ½ cup of spaghetti or macaroni (30)    1 flour or corn tortilla (50)    1 four-inch waffle (50)    What other foods contain potassium? 1 tablespoon of molasses (295)    1½ ounces of chocolate (165)    Some salt substitutes may contain a high amount of potassium. Check the food label to find the amount of potassium it contains. CARE AGREEMENT:   You have the right to help plan your care. Discuss treatment options with your healthcare provider to decide what care you want to receive. You always have the right to refuse treatment. The above information is an  only. It is not intended as medical advice for individual conditions or treatments. Talk to your doctor, nurse or pharmacist before following any medical regimen to see if it is safe and effective for you. © Copyright Frederick Angulo 2022 Information is for End User's use only and may not be sold, redistributed or otherwise used for commercial purposes.

## 2023-07-21 NOTE — ASSESSMENT & PLAN NOTE
Patient on Lipitor 40 keeping LDL near goal at 105 which is improved from 114. Triglycerides slightly elevated at 163 however HDL good at 73 continue recheck 6 months.

## 2023-07-26 LAB — COLOGUARD RESULT REPORTABLE: NORMAL

## 2023-08-14 ENCOUNTER — TELEPHONE (OUTPATIENT)
Dept: ENDOCRINOLOGY | Facility: CLINIC | Age: 63
End: 2023-08-14

## 2023-08-14 ENCOUNTER — OFFICE VISIT (OUTPATIENT)
Dept: ENDOCRINOLOGY | Facility: CLINIC | Age: 63
End: 2023-08-14
Payer: COMMERCIAL

## 2023-08-14 VITALS
SYSTOLIC BLOOD PRESSURE: 132 MMHG | DIASTOLIC BLOOD PRESSURE: 74 MMHG | HEIGHT: 66 IN | BODY MASS INDEX: 26.07 KG/M2 | HEART RATE: 67 BPM | WEIGHT: 162.2 LBS

## 2023-08-14 DIAGNOSIS — M81.0 AGE-RELATED OSTEOPOROSIS WITHOUT CURRENT PATHOLOGICAL FRACTURE: Primary | ICD-10-CM

## 2023-08-14 DIAGNOSIS — E55.9 VITAMIN D DEFICIENCY: ICD-10-CM

## 2023-08-14 PROCEDURE — 96372 THER/PROPH/DIAG INJ SC/IM: CPT

## 2023-08-14 PROCEDURE — 99213 OFFICE O/P EST LOW 20 MIN: CPT | Performed by: INTERNAL MEDICINE

## 2023-08-14 NOTE — TELEPHONE ENCOUNTER
Patient is scheduled for a prolia injection on Friday 2/16/2024. She also asked if we could make sure the insurance covers the prolia.    Thanks

## 2023-08-14 NOTE — PROGRESS NOTES
Jorge Troy 58 y.o. female MRN: [id number]    Encounter: [id number]      Assessment/Plan     Assessment: This is a 58y.o.-year-old female with osteoporosis and vitamin D deficiency. Plan:  1. Osteoporosis-continue Prolia injection. She is going to get an injection today. I did discuss strength training to improve muscle strength in order to prevent falls. She is going to try doing some of these. 2.  Vitamin D deficiency-continue supplementation. CC:   Osteoporosis    History of Present Illness     HPI:  67-year-old woman with osteoporosis presents for follow-up. She denies any falls or fractures. She is on calcium and vitamin D supplementation. Review of Systems   Constitutional: Negative for chills and fever. Respiratory: Negative for shortness of breath. Cardiovascular: Negative for chest pain. Gastrointestinal: Negative for constipation, diarrhea, nausea and vomiting. All other systems reviewed and are negative. Historical Information   Past Medical History:   Diagnosis Date   • Anxiety    • Hypercholesteremia      History reviewed. No pertinent surgical history.   Social History   Social History     Substance and Sexual Activity   Alcohol Use Yes   • Alcohol/week: 1.0 standard drink of alcohol   • Types: 1 Glasses of wine per week    Comment: social     Social History     Substance and Sexual Activity   Drug Use Never     Social History     Tobacco Use   Smoking Status Never   Smokeless Tobacco Never     Family History:   Family History   Problem Relation Age of Onset   • Heart attack Mother    • Arthritis Mother    • Diabetes Mother    • Hypertension Mother    • Hypothyroidism Mother    • Diabetes type I Mother    • Aneurysm Father         Abd Aorta   • Heart disease Father         CABG   • Anxiety disorder Paternal Grandmother    • No Known Problems Maternal Grandmother    • No Known Problems Maternal Grandfather    • No Known Problems Paternal Grandfather    • Lung cancer Paternal Aunt         age unknown       Meds/Allergies   Current Outpatient Medications   Medication Sig Dispense Refill   • ALPRAZolam (XANAX) 0.5 mg tablet Take 1 tablet (0.5 mg total) by mouth 2 (two) times a day as needed for anxiety 30 tablet 0   • aspirin 81 MG tablet Take 1 tablet by mouth daily     • atorvastatin (LIPITOR) 40 mg tablet TAKE 1 TABLET DAILY 90 tablet 3   • Cholecalciferol 50 MCG (2000 UT) CAPS Take 1 capsule by mouth daily     • Cyanocobalamin (VITAMIN B12 PO) Take by mouth     • FLUoxetine (PROzac) 40 MG capsule TAKE 1 CAPSULE DAILY 90 capsule 3   • Multiple Vitamin (DAILY VALUE MULTIVITAMIN) TABS Take 1 tablet by mouth daily     • Omega-3 Fatty Acids (FISH OIL) 600 MG CAPS Take by mouth     • vitamin E, tocopherol, 400 units capsule Take by mouth       Current Facility-Administered Medications   Medication Dose Route Frequency Provider Last Rate Last Admin   • denosumab (PROLIA) subcutaneous injection 60 mg  60 mg Subcutaneous Q6 Months Will Keita MD   60 mg at 08/14/23 1138     Allergies   Allergen Reactions   • Augmentin [Amoxicillin-Pot Clavulanate] Abdominal Pain   • Penicillins GI Intolerance       Objective   Vitals: Blood pressure 132/74, pulse 67, height 5' 5.5" (1.664 m), weight 73.6 kg (162 lb 3.2 oz). Physical Exam  Vitals reviewed. Constitutional:       General: She is not in acute distress. Appearance: She is well-developed. She is not diaphoretic. HENT:      Head: Normocephalic and atraumatic. Mouth/Throat:      Pharynx: No oropharyngeal exudate. Eyes:      General: Lids are normal. No scleral icterus. Right eye: No discharge. Left eye: No discharge. Conjunctiva/sclera: Conjunctivae normal.   Neck:      Thyroid: No thyromegaly. Cardiovascular:      Rate and Rhythm: Normal rate and regular rhythm. Heart sounds: Normal heart sounds. No murmur heard. No friction rub. No gallop.    Pulmonary:      Effort: Pulmonary effort is normal. No respiratory distress. Breath sounds: Normal breath sounds. No wheezing. Abdominal:      General: Bowel sounds are normal. There is no distension. Palpations: Abdomen is soft. Tenderness: There is no abdominal tenderness. Musculoskeletal:         General: No tenderness or deformity. Normal range of motion. Cervical back: Neck supple. Lymphadenopathy:      Head:      Right side of head: No occipital adenopathy. Left side of head: No occipital adenopathy. Upper Body:      Right upper body: No supraclavicular adenopathy. Left upper body: No supraclavicular adenopathy. Skin:     General: Skin is warm. Findings: No erythema or rash. Neurological:      Mental Status: She is alert and oriented to person, place, and time. Cranial Nerves: No cranial nerve deficit. Psychiatric:         Mood and Affect: Mood normal.         Behavior: Behavior normal.         The history was obtained from the review of the chart, patient. Lab Results:   Lab Results   Component Value Date/Time    Potassium 3.4 (L) 07/18/2023 10:46 AM    Potassium 3.7 12/21/2022 12:49 PM    Chloride 110 (H) 07/18/2023 10:46 AM    Chloride 108 12/21/2022 12:49 PM    CO2 26 07/18/2023 10:46 AM    CO2 28 12/21/2022 12:49 PM    BUN 11 07/18/2023 10:46 AM    BUN 16 12/21/2022 12:49 PM    Creatinine 0.82 07/18/2023 10:46 AM    Creatinine 0.72 12/21/2022 12:49 PM    Glucose, Fasting 86 07/18/2023 10:46 AM    Glucose, Fasting 91 12/21/2022 12:49 PM    Calcium 9.0 07/18/2023 10:46 AM    Calcium 8.9 12/21/2022 12:49 PM    eGFR 75 07/18/2023 10:46 AM    eGFR 89 12/21/2022 12:49 PM    Vit D, 25-Hydroxy 43.6 12/21/2022 12:49 PM         Imaging Studies:         Results for orders placed during the hospital encounter of 06/12/23    DXA bone density spine hip and pelvis    Impression  1.  Based on the [facility] classification, this study identifies a diagnosis of osteoporosis, notable at the spine area and the patient is considered at  risk for fracture. 2. A daily intake of calcium of at least 1200 mg and vitamin D, 800-1000 IU, as well as weight bearing and muscle strengthening exercise, fall prevention and avoidance of tobacco and excessive alcohol intake as basic preventive measures are recommended. 3. Repeat DXA scan on the same equipment in 18-24 months as clinically indicated. The 10 year risk of hip fracture is 0.9%, with the 10 year risk of major osteoporotic fracture being 17%, as calculated by the [facility] fracture risk assessment tool (FRAX). The current NOF guidelines recommend treating patients with FRAX 10 year risk score  of >3% for hip fracture and >20% for major osteoporotic fracture. WHO CLASSIFICATION:  Normal (a T-score of -1.0 or higher)  Low bone mineral density (a T-score of less than -1.0 but higher than -2.5)  Osteoporosis (a T-score of -2.5 or less)  Severe osteoporosis (a T-score of -2.5 or less with a fragility fracture)    Thank you for allowing us the opportunity to participate in your patient care. The expanded DEXA report will no longer be arriving in your mail. If you desire to view the full report please contact Brad villasenor or access the PACS system. Workstation performed: [id number]            I have personally reviewed pertinent reports. Portions of the record may have been created with voice recognition software. Occasional wrong word or "sound a like" substitutions may have occurred due to the inherent limitations of voice recognition software. Read the chart carefully and recognize, using context, where substitutions have occurred. Assessment/Plan:    Jorge Troy came into the [address] [place] [address]'s Endocrinology Office today 08/14/23 to receive Prolia injection. Verbal consent obtained. Consent given by: patient    patient states patient has been medically healthy with no underlining concerns/complications.       Jorge Troy presents with no symptoms today. All insturctions were reviewed with the patient. If the patient should have any questions/concerns, advised patient to contacted [facility] Endocrinology Office. Subjective:     History provided by: patient    Patient ID: Remigio Ochoa is a 58 y.o. female      Objective:    Vitals:    08/14/23 1052   BP: 132/74   Pulse: 67   Weight: 73.6 kg (162 lb 3.2 oz)   Height: 5' 5.5" (1.664 m)       Patient tolerated the injection well without any complications. Injection site/s right arm. Medication was provided by office. Patient signed consent form yes   Patient signed Mayes Other form no (If no patient is not a medicare patient). Patient waited 15 minutes after injection no (This only applies to patient's receiving first time injection).        Last Visit: Visit date not found  Next visit:8/14/2023

## 2023-08-28 ENCOUNTER — TELEPHONE (OUTPATIENT)
Dept: ENDOCRINOLOGY | Facility: CLINIC | Age: 63
End: 2023-08-28

## 2023-08-28 NOTE — TELEPHONE ENCOUNTER
I called insurance and spoke to [address] with [employer] at 10:40 am. She states they will not backdate the prior [facility]. Reference number for the call is today's date and her name.

## 2023-08-28 NOTE — TELEPHONE ENCOUNTER
Billing made aware to write off prolia as insurance will not back date authorization due to office error

## 2023-08-30 ENCOUNTER — HOSPITAL ENCOUNTER (OUTPATIENT)
Dept: MAMMOGRAPHY | Facility: MEDICAL CENTER | Age: 63
Discharge: HOME | End: 2023-08-30
Payer: COMMERCIAL

## 2023-08-30 VITALS — BODY MASS INDEX: 26.03 KG/M2 | WEIGHT: 162 LBS | HEIGHT: 66 IN

## 2023-08-30 DIAGNOSIS — Z12.31 ENCOUNTER FOR SCREENING MAMMOGRAM FOR MALIGNANT NEOPLASM OF BREAST: ICD-10-CM

## 2023-08-30 LAB — COLOGUARD RESULT REPORTABLE: NEGATIVE

## 2023-08-30 PROCEDURE — 77063 BREAST TOMOSYNTHESIS BI: CPT

## 2023-08-30 PROCEDURE — 77067 SCR MAMMO BI INCL CAD: CPT

## 2024-01-18 ENCOUNTER — TELEPHONE (OUTPATIENT)
Dept: ENDOCRINOLOGY | Facility: CLINIC | Age: 64
End: 2024-01-18

## 2024-01-24 ENCOUNTER — APPOINTMENT (OUTPATIENT)
Dept: LAB | Facility: CLINIC | Age: 64
End: 2024-01-24
Payer: COMMERCIAL

## 2024-01-24 DIAGNOSIS — E55.9 VITAMIN D DEFICIENCY: ICD-10-CM

## 2024-01-24 DIAGNOSIS — M81.0 AGE-RELATED OSTEOPOROSIS WITHOUT CURRENT PATHOLOGICAL FRACTURE: ICD-10-CM

## 2024-01-24 DIAGNOSIS — E78.2 MIXED HYPERLIPIDEMIA: ICD-10-CM

## 2024-01-24 LAB
25(OH)D3 SERPL-MCNC: 40 NG/ML (ref 30–100)
ALBUMIN SERPL BCP-MCNC: 4.2 G/DL (ref 3.5–5)
ALP SERPL-CCNC: 81 U/L (ref 34–104)
ALT SERPL W P-5'-P-CCNC: 31 U/L (ref 7–52)
ANION GAP SERPL CALCULATED.3IONS-SCNC: 7 MMOL/L
AST SERPL W P-5'-P-CCNC: 24 U/L (ref 13–39)
BASOPHILS # BLD AUTO: 0.07 THOUSANDS/ÂΜL (ref 0–0.1)
BASOPHILS NFR BLD AUTO: 1 % (ref 0–1)
BILIRUB SERPL-MCNC: 0.52 MG/DL (ref 0.2–1)
BUN SERPL-MCNC: 17 MG/DL (ref 5–25)
CALCIUM SERPL-MCNC: 9.1 MG/DL (ref 8.4–10.2)
CHLORIDE SERPL-SCNC: 104 MMOL/L (ref 96–108)
CHOLEST SERPL-MCNC: 204 MG/DL
CO2 SERPL-SCNC: 29 MMOL/L (ref 21–32)
CREAT SERPL-MCNC: 0.65 MG/DL (ref 0.6–1.3)
EOSINOPHIL # BLD AUTO: 0.11 THOUSAND/ÂΜL (ref 0–0.61)
EOSINOPHIL NFR BLD AUTO: 2 % (ref 0–6)
ERYTHROCYTE [DISTWIDTH] IN BLOOD BY AUTOMATED COUNT: 13 % (ref 11.6–15.1)
GFR SERPL CREATININE-BSD FRML MDRD: 94 ML/MIN/1.73SQ M
GLUCOSE P FAST SERPL-MCNC: 83 MG/DL (ref 65–99)
HCT VFR BLD AUTO: 38.3 % (ref 34.8–46.1)
HDLC SERPL-MCNC: 58 MG/DL
HGB BLD-MCNC: 12.8 G/DL (ref 11.5–15.4)
IMM GRANULOCYTES # BLD AUTO: 0.01 THOUSAND/UL (ref 0–0.2)
IMM GRANULOCYTES NFR BLD AUTO: 0 % (ref 0–2)
LDLC SERPL CALC-MCNC: 117 MG/DL (ref 0–100)
LYMPHOCYTES # BLD AUTO: 2.34 THOUSANDS/ÂΜL (ref 0.6–4.47)
LYMPHOCYTES NFR BLD AUTO: 38 % (ref 14–44)
MCH RBC QN AUTO: 30.8 PG (ref 26.8–34.3)
MCHC RBC AUTO-ENTMCNC: 33.4 G/DL (ref 31.4–37.4)
MCV RBC AUTO: 92 FL (ref 82–98)
MONOCYTES # BLD AUTO: 0.54 THOUSAND/ÂΜL (ref 0.17–1.22)
MONOCYTES NFR BLD AUTO: 9 % (ref 4–12)
NEUTROPHILS # BLD AUTO: 3.12 THOUSANDS/ÂΜL (ref 1.85–7.62)
NEUTS SEG NFR BLD AUTO: 50 % (ref 43–75)
NRBC BLD AUTO-RTO: 0 /100 WBCS
PLATELET # BLD AUTO: 283 THOUSANDS/UL (ref 149–390)
PMV BLD AUTO: 10 FL (ref 8.9–12.7)
POTASSIUM SERPL-SCNC: 4.1 MMOL/L (ref 3.5–5.3)
PROT SERPL-MCNC: 7 G/DL (ref 6.4–8.4)
RBC # BLD AUTO: 4.16 MILLION/UL (ref 3.81–5.12)
SODIUM SERPL-SCNC: 140 MMOL/L (ref 135–147)
TRIGL SERPL-MCNC: 145 MG/DL
WBC # BLD AUTO: 6.19 THOUSAND/UL (ref 4.31–10.16)

## 2024-01-24 PROCEDURE — 80061 LIPID PANEL: CPT

## 2024-01-24 PROCEDURE — 82306 VITAMIN D 25 HYDROXY: CPT

## 2024-01-24 PROCEDURE — 80053 COMPREHEN METABOLIC PANEL: CPT

## 2024-01-24 PROCEDURE — 36415 COLL VENOUS BLD VENIPUNCTURE: CPT

## 2024-01-24 PROCEDURE — 85025 COMPLETE CBC W/AUTO DIFF WBC: CPT

## 2024-01-30 ENCOUNTER — OFFICE VISIT (OUTPATIENT)
Dept: FAMILY MEDICINE CLINIC | Facility: CLINIC | Age: 64
End: 2024-01-30
Payer: COMMERCIAL

## 2024-01-30 VITALS
DIASTOLIC BLOOD PRESSURE: 68 MMHG | HEART RATE: 74 BPM | SYSTOLIC BLOOD PRESSURE: 122 MMHG | BODY MASS INDEX: 26.03 KG/M2 | OXYGEN SATURATION: 96 % | HEIGHT: 66 IN | WEIGHT: 162 LBS

## 2024-01-30 DIAGNOSIS — M81.0 AGE-RELATED OSTEOPOROSIS WITHOUT CURRENT PATHOLOGICAL FRACTURE: ICD-10-CM

## 2024-01-30 DIAGNOSIS — F41.9 ANXIETY: ICD-10-CM

## 2024-01-30 DIAGNOSIS — E55.9 VITAMIN D DEFICIENCY: Primary | ICD-10-CM

## 2024-01-30 DIAGNOSIS — E78.2 MIXED HYPERLIPIDEMIA: ICD-10-CM

## 2024-01-30 PROCEDURE — 99214 OFFICE O/P EST MOD 30 MIN: CPT | Performed by: PHYSICIAN ASSISTANT

## 2024-01-30 RX ORDER — ALPRAZOLAM 0.5 MG/1
0.5 TABLET ORAL 2 TIMES DAILY PRN
Qty: 30 TABLET | Refills: 0 | Status: CANCELLED | OUTPATIENT
Start: 2024-01-30 | End: 2024-02-29

## 2024-01-30 RX ORDER — ALPRAZOLAM 0.5 MG/1
0.5 TABLET ORAL 2 TIMES DAILY PRN
Qty: 30 TABLET | Refills: 0 | Status: SHIPPED | OUTPATIENT
Start: 2024-01-30 | End: 2024-02-29

## 2024-01-30 NOTE — PROGRESS NOTES
Name: Angela Maya      : 1960      MRN: [id number]  Encounter Provider: Elsa Chen PA-C  Encounter Date: 2024   [facility]    Assessment & Plan     1. Vitamin D deficiency  Assessment & Plan:  Vitamin D was normal at 40 continue supplementation check yearly.      2. Anxiety  Assessment & Plan:  Stable on Prozac.      3. Mixed hyperlipidemia  Assessment & Plan:  Patient is on Lipitor 40 and her LDL is still 117 up from 105.  Triglycerides this time however are normal.  No change continue recheck 6 months.    Orders:  -     Lipid Panel with Direct LDL reflex; Future; Expected date: 2024  -     Comprehensive metabolic panel; Future; Expected date: 2024    4. Age-related osteoporosis without current pathological fracture  Assessment & Plan:  Continue with endo and Prolia.          Depression Screening and Follow-up Plan: Patient was screened for depression during today's encounter. They screened negative with a PHQ-2 score of 0.        Subjective        Angela Maya is here for chronic conditions f/u. Pt. had labs done prior to today's visit which included Recent Results (from the past 672 hour(s))  -Vitamin D 25 hydroxy:   Collection Time: 24 11:45 AM       Result                      Value             Ref Range           Vit D, 25-Hydroxy           40.0              30.0 - 100.0*  -Lipid Panel with Direct LDL reflex:   Collection Time: 24 11:45 AM       Result                      Value             Ref Range           Cholesterol                 204 (H)           See Comment *       Triglycerides               145               See Comment *       HDL, Direct                 58                >=50 mg/dL          LDL Calculated              117 (H)           0 - 100 mg/dL  -Comprehensive metabolic panel:   Collection Time: 24 11:45 AM       Result                      Value             Ref Range           Sodium                       140               135 - 147 mm*       Potassium                   4.1               3.5 - 5.3 mm*       Chloride                    104               96 - 108 mmo*       CO2                         29                21 - 32 mmol*       ANION GAP                   7                 mmol/L              BUN                         17                5 - 25 mg/dL        Creatinine                  0.65              0.60 - 1.30 *       Glucose, Fasting            83                65 - 99 mg/dL       Calcium                     9.1               8.4 - 10.2 m*       AST                         24                13 - 39 U/L         ALT                         31                7 - 52 U/L          Alkaline Phosphatase        81                34 - 104 U/L        Total Protein               7.0               6.4 - 8.4 g/*       Albumin                     4.2               3.5 - 5.0 g/*       Total Bilirubin             0.52              0.20 - 1.00 *       eGFR                        94                ml/min/1.73s*  -CBC and differential:   Collection Time: 01/24/24 11:45 AM       Result                      Value             Ref Range           WBC                         6.19              4.31 - 10.16*       RBC                         4.16              3.81 - 5.12 *       Hemoglobin                  12.8              11.5 - 15.4 *       Hematocrit                  38.3              34.8 - 46.1 %       MCV                         92                82 - 98 fL          MCH                         30.8              26.8 - 34.3 *       MCHC                        33.4              31.4 - 37.4 *       RDW                         13.0              11.6 - 15.1 %       MPV                         10.0              8.9 - 12.7 fL       Platelets                   283               149 - 390 Th*       nRBC                        0                 /100 WBCs           Neutrophils Relative        50                43 - 75 %            Immat GRANS %               0                 0 - 2 %             Lymphocytes Relative        38                14 - 44 %           Monocytes Relative          9                 4 - 12 %            Eosinophils Relative        2                 0 - 6 %             Basophils Relative          1                 0 - 1 %             Neutrophils Absolute        3.12              1.85 - 7.62 *       Immature Grans Absolute     0.01              0.00 - 0.20 *       Lymphocytes Absolute        2.34              0.60 - 4.47 *       Monocytes Absolute          0.54              0.17 - 1.22 *       Eosinophils Absolute        0.11              0.00 - 0.61 *       Basophils Absolute          0.07              0.00 - 0.10 *        Review of Systems   Constitutional: Negative.    HENT: Negative.     Eyes: Negative.    Respiratory: Negative.     Cardiovascular: Negative.    Gastrointestinal: Negative.    Endocrine: Negative.    Genitourinary: Negative.    Musculoskeletal: Negative.    Skin: Negative.    Allergic/Immunologic: Negative.    Neurological: Negative.    Hematological: Negative.    Psychiatric/Behavioral: Negative.         Current Outpatient Medications on File Prior to Visit   Medication Sig   • aspirin 81 MG tablet Take 1 tablet by mouth daily   • atorvastatin (LIPITOR) 40 mg tablet TAKE 1 TABLET DAILY   • Cholecalciferol 50 MCG (2000 UT) CAPS Take 1 capsule by mouth daily   • Cyanocobalamin (VITAMIN B12 PO) Take by mouth   • FLUoxetine (PROzac) 40 MG capsule TAKE 1 CAPSULE DAILY   • Multiple Vitamin (DAILY VALUE MULTIVITAMIN) TABS Take 1 tablet by mouth daily   • Omega-3 Fatty Acids (FISH OIL) 600 MG CAPS Take by mouth   • vitamin E, tocopherol, 400 units capsule Take by mouth   • ALPRAZolam (XANAX) 0.5 mg tablet Take 1 tablet (0.5 mg total) by mouth 2 (two) times a day as needed for anxiety       Objective     /68 (BP Location: Left arm, Patient Position: Sitting, Cuff Size: Standard)   Pulse 74   Ht 5'  "5.5\" (1.664 m)   Wt 73.5 kg (162 lb)   SpO2 96%   BMI 26.55 kg/m²     Physical Exam  Vitals and nursing note reviewed.   Constitutional:       General: She is not in acute distress.     Appearance: She is well-developed. She is not diaphoretic.   HENT:      Head: Normocephalic and atraumatic.      Nose: Nose normal.   Eyes:      General:         Right eye: No discharge.         Left eye: No discharge.      Conjunctiva/sclera: Conjunctivae normal.   Neck:      Vascular: No carotid bruit.   Cardiovascular:      Rate and Rhythm: Normal rate and regular rhythm.      Heart sounds: Normal heart sounds. No murmur heard.     No friction rub. No gallop.   Pulmonary:      Effort: Pulmonary effort is normal. No respiratory distress.      Breath sounds: Normal breath sounds. No wheezing or rales.   Musculoskeletal:      Cervical back: Neck supple.   Skin:     General: Skin is warm and dry.   Neurological:      Mental Status: She is alert and oriented to person, place, and time.   Psychiatric:         Judgment: Judgment normal.       Elsa Chen PA-C    "

## 2024-01-30 NOTE — ASSESSMENT & PLAN NOTE
Patient is on Lipitor 40 and her LDL is still 117 up from 105.  Triglycerides this time however are normal.  No change continue recheck 6 months.

## 2024-01-30 NOTE — PATIENT INSTRUCTIONS
1. Vitamin D deficiency  Assessment & Plan:  Vitamin D was normal at 40 continue supplementation check yearly.      2. Anxiety  Assessment & Plan:  Stable on Prozac.    Orders:  -     ALPRAZolam (XANAX) 0.5 mg tablet; Take 1 tablet (0.5 mg total) by mouth 2 (two) times a day as needed for anxiety    3. Mixed hyperlipidemia  Assessment & Plan:  Patient is on Lipitor 40 and her LDL is still 117 up from 105.  Triglycerides this time however are normal.  No change continue recheck 6 months.    Orders:  -     Lipid Panel with Direct LDL reflex; Future; Expected date: 07/30/2024  -     Comprehensive metabolic panel; Future; Expected date: 07/30/2024    4. Age-related osteoporosis without current pathological fracture  Assessment & Plan:  Continue with endo and Prolia.

## 2024-02-05 ENCOUNTER — PATIENT MESSAGE (OUTPATIENT)
Dept: ENDOCRINOLOGY | Facility: CLINIC | Age: 64
End: 2024-02-05

## 2024-02-12 ENCOUNTER — TELEPHONE (OUTPATIENT)
Dept: ENDOCRINOLOGY | Facility: HOSPITAL | Age: 64
End: 2024-02-12

## 2024-02-12 NOTE — TELEPHONE ENCOUNTER
I'm still waitng on a Summary of Benefits. I do know her new insurance will require a prior auth. I have completed the prior auth form and faxed it with clinical information tot [phone].

## 2024-02-14 NOTE — TELEPHONE ENCOUNTER
Received summary of benefits for Prolia. Prior auth required. She has met $183.92 of her $700 deductible. Once met she will have a 20% copay and a 20% admin fee.     Prior auth was approved for 2 buy and bill visit's from 2-16-24 through 2-15-25. Prior auth # [id number].

## 2024-02-16 ENCOUNTER — CLINICAL SUPPORT (OUTPATIENT)
Dept: ENDOCRINOLOGY | Facility: CLINIC | Age: 64
End: 2024-02-16
Payer: COMMERCIAL

## 2024-02-16 VITALS
DIASTOLIC BLOOD PRESSURE: 80 MMHG | OXYGEN SATURATION: 98 % | HEIGHT: 66 IN | SYSTOLIC BLOOD PRESSURE: 132 MMHG | BODY MASS INDEX: 26.55 KG/M2 | HEART RATE: 78 BPM

## 2024-02-16 DIAGNOSIS — M81.0 OSTEOPOROSIS, UNSPECIFIED OSTEOPOROSIS TYPE, UNSPECIFIED PATHOLOGICAL FRACTURE PRESENCE: Primary | ICD-10-CM

## 2024-02-16 PROCEDURE — 96372 THER/PROPH/DIAG INJ SC/IM: CPT

## 2024-02-16 NOTE — PROGRESS NOTES
"Assessment/Plan:    Angela Maya came into the [facility] Endocrinology Office today 02/16/24 to receive Prolia injection.      Verbal consent obtained.  Consent given by: patient    patient states patient has been medically healthy with no underlining concerns/complications.      Angela Maya presents with No symptoms today.       All insturctions were reviewed with the patient.    If the patient should have any questions/concerns, advised patient to contacted [facility] Endocrinology Office.       Subjective:     History provided by: patient    Patient ID: Angela Maya is a 63 y.o. female      Objective:    Vitals:    02/16/24 1400   BP: 132/80   BP Location: Right arm   Patient Position: Sitting   Cuff Size: Adult   Pulse: 78   SpO2: 98%   Height: 5' 5.5\" (1.664 m)       Patient tolerated the injection well without any complications.  Injection site/s Left side.  Medication was provided by Office.    Patient signed consent form yes   Patient signed ABN form no (If no patient is not a medicare patient).   Patient waited 15 minutes after injection no (This only applies to patient's receiving first time injection).       Last Visit: 1/18/2024  Next visit:Visit date not found      "

## 2024-05-16 DIAGNOSIS — F32.89 OTHER DEPRESSION: ICD-10-CM

## 2024-05-16 DIAGNOSIS — E78.2 MIXED HYPERLIPIDEMIA: ICD-10-CM

## 2024-05-16 RX ORDER — FLUOXETINE HYDROCHLORIDE 40 MG/1
CAPSULE ORAL
Qty: 90 CAPSULE | Refills: 1 | Status: SHIPPED | OUTPATIENT
Start: 2024-05-16

## 2024-05-16 RX ORDER — ATORVASTATIN CALCIUM 40 MG/1
TABLET, FILM COATED ORAL
Qty: 90 TABLET | Refills: 1 | Status: SHIPPED | OUTPATIENT
Start: 2024-05-16

## 2024-07-20 DIAGNOSIS — E78.2 MIXED HYPERLIPIDEMIA: ICD-10-CM

## 2024-07-20 RX ORDER — ATORVASTATIN CALCIUM 40 MG/1
40 TABLET, FILM COATED ORAL DAILY
Qty: 90 TABLET | Refills: 0 | Status: SHIPPED | OUTPATIENT
Start: 2024-07-20

## 2024-07-31 ENCOUNTER — APPOINTMENT (OUTPATIENT)
Dept: LAB | Facility: CLINIC | Age: 64
End: 2024-07-31
Payer: MEDICARE

## 2024-07-31 DIAGNOSIS — E78.2 MIXED HYPERLIPIDEMIA: ICD-10-CM

## 2024-07-31 LAB
ALBUMIN SERPL BCG-MCNC: 4 G/DL (ref 3.5–5)
ALP SERPL-CCNC: 248 U/L (ref 34–104)
ALT SERPL W P-5'-P-CCNC: 93 U/L (ref 7–52)
ANION GAP SERPL CALCULATED.3IONS-SCNC: 8 MMOL/L (ref 4–13)
AST SERPL W P-5'-P-CCNC: 24 U/L (ref 13–39)
BILIRUB SERPL-MCNC: 0.39 MG/DL (ref 0.2–1)
BUN SERPL-MCNC: 12 MG/DL (ref 5–25)
CALCIUM SERPL-MCNC: 9.4 MG/DL (ref 8.4–10.2)
CHLORIDE SERPL-SCNC: 103 MMOL/L (ref 96–108)
CHOLEST SERPL-MCNC: 199 MG/DL
CO2 SERPL-SCNC: 30 MMOL/L (ref 21–32)
CREAT SERPL-MCNC: 0.68 MG/DL (ref 0.6–1.3)
GFR SERPL CREATININE-BSD FRML MDRD: 92 ML/MIN/1.73SQ M
GLUCOSE P FAST SERPL-MCNC: 98 MG/DL (ref 65–99)
HDLC SERPL-MCNC: 40 MG/DL
LDLC SERPL CALC-MCNC: 129 MG/DL (ref 0–100)
POTASSIUM SERPL-SCNC: 3.3 MMOL/L (ref 3.5–5.3)
PROT SERPL-MCNC: 7.5 G/DL (ref 6.4–8.4)
SODIUM SERPL-SCNC: 141 MMOL/L (ref 135–147)
TRIGL SERPL-MCNC: 150 MG/DL

## 2024-07-31 PROCEDURE — 80053 COMPREHEN METABOLIC PANEL: CPT

## 2024-07-31 PROCEDURE — 80061 LIPID PANEL: CPT

## 2024-07-31 PROCEDURE — 36415 COLL VENOUS BLD VENIPUNCTURE: CPT

## 2024-08-06 ENCOUNTER — OFFICE VISIT (OUTPATIENT)
Dept: FAMILY MEDICINE CLINIC | Facility: CLINIC | Age: 64
End: 2024-08-06
Payer: MEDICARE

## 2024-08-06 VITALS
SYSTOLIC BLOOD PRESSURE: 118 MMHG | WEIGHT: 166.4 LBS | HEART RATE: 84 BPM | DIASTOLIC BLOOD PRESSURE: 74 MMHG | HEIGHT: 66 IN | BODY MASS INDEX: 26.74 KG/M2 | OXYGEN SATURATION: 96 %

## 2024-08-06 DIAGNOSIS — E55.9 VITAMIN D DEFICIENCY: ICD-10-CM

## 2024-08-06 DIAGNOSIS — R79.89 ELEVATED LFTS: ICD-10-CM

## 2024-08-06 DIAGNOSIS — R05.2 SUBACUTE COUGH: ICD-10-CM

## 2024-08-06 DIAGNOSIS — R74.8 ELEVATED ALKALINE PHOSPHATASE LEVEL: ICD-10-CM

## 2024-08-06 DIAGNOSIS — Z23 ENCOUNTER FOR IMMUNIZATION: ICD-10-CM

## 2024-08-06 DIAGNOSIS — M81.0 AGE-RELATED OSTEOPOROSIS WITHOUT CURRENT PATHOLOGICAL FRACTURE: ICD-10-CM

## 2024-08-06 DIAGNOSIS — F32.89 OTHER DEPRESSION: ICD-10-CM

## 2024-08-06 DIAGNOSIS — F41.9 ANXIETY: ICD-10-CM

## 2024-08-06 DIAGNOSIS — R31.9 HEMATURIA, UNSPECIFIED TYPE: ICD-10-CM

## 2024-08-06 DIAGNOSIS — R10.10 PAIN OF UPPER ABDOMEN: ICD-10-CM

## 2024-08-06 DIAGNOSIS — N30.01 ACUTE CYSTITIS WITH HEMATURIA: ICD-10-CM

## 2024-08-06 DIAGNOSIS — N39.3 FEMALE STRESS INCONTINENCE: Primary | ICD-10-CM

## 2024-08-06 DIAGNOSIS — E78.2 MIXED HYPERLIPIDEMIA: ICD-10-CM

## 2024-08-06 PROBLEM — R10.9 ABDOMINAL PAIN: Status: ACTIVE | Noted: 2024-08-06

## 2024-08-06 LAB
SL AMB  POCT GLUCOSE, UA: NEGATIVE
SL AMB LEUKOCYTE ESTERASE,UA: ABNORMAL
SL AMB POCT BILIRUBIN,UA: NEGATIVE
SL AMB POCT BLOOD,UA: ABNORMAL
SL AMB POCT CLARITY,UA: ABNORMAL
SL AMB POCT COLOR,UA: YELLOW
SL AMB POCT KETONES,UA: NEGATIVE
SL AMB POCT NITRITE,UA: POSITIVE
SL AMB POCT PH,UA: 7
SL AMB POCT SPECIFIC GRAVITY,UA: 1.02
SL AMB POCT URINE PROTEIN: NEGATIVE
SL AMB POCT UROBILINOGEN: 0.2

## 2024-08-06 PROCEDURE — 81002 URINALYSIS NONAUTO W/O SCOPE: CPT | Performed by: PHYSICIAN ASSISTANT

## 2024-08-06 PROCEDURE — G0439 PPPS, SUBSEQ VISIT: HCPCS | Performed by: PHYSICIAN ASSISTANT

## 2024-08-06 PROCEDURE — 90677 PCV20 VACCINE IM: CPT

## 2024-08-06 PROCEDURE — 87086 URINE CULTURE/COLONY COUNT: CPT | Performed by: PHYSICIAN ASSISTANT

## 2024-08-06 PROCEDURE — 99214 OFFICE O/P EST MOD 30 MIN: CPT | Performed by: PHYSICIAN ASSISTANT

## 2024-08-06 PROCEDURE — 87077 CULTURE AEROBIC IDENTIFY: CPT | Performed by: PHYSICIAN ASSISTANT

## 2024-08-06 PROCEDURE — G0009 ADMIN PNEUMOCOCCAL VACCINE: HCPCS

## 2024-08-06 PROCEDURE — 87186 SC STD MICRODIL/AGAR DIL: CPT | Performed by: PHYSICIAN ASSISTANT

## 2024-08-06 RX ORDER — FLUOXETINE HYDROCHLORIDE 20 MG/1
20 CAPSULE ORAL DAILY
Qty: 90 CAPSULE | Refills: 3 | Status: SHIPPED | OUTPATIENT
Start: 2024-08-06

## 2024-08-06 RX ORDER — NITROFURANTOIN 25; 75 MG/1; MG/1
100 CAPSULE ORAL 2 TIMES DAILY
Qty: 14 CAPSULE | Refills: 0 | Status: SHIPPED | OUTPATIENT
Start: 2024-08-06 | End: 2024-08-13

## 2024-08-06 NOTE — ASSESSMENT & PLAN NOTE
Continue Prozac 40.  Uncontrolled so we will add 20 mg of Prozac to the 40.  Recheck with next visit.

## 2024-08-06 NOTE — PROGRESS NOTES
Ambulatory Visit  Name: Angela Maya      : 1960      MRN: [id number]  Encounter Provider: Elsa Chen PA-C  Encounter Date: 2024   Encounter department: [facility] PRIMARY CARE    Assessment & Plan   1. Female stress incontinence  2. Mixed hyperlipidemia  Assessment & Plan:  Patient is on Lipitor 40 and unfortunately her bad cholesterol has gone up to 129 and it was 117 last time and 105 prior to that.  Would consider increasing Lipitor or adding Zetia however at this time her AST is elevated as well as alk phos so we need to figure this out before we would make any medication adjustments.  Orders:  -     Comprehensive metabolic panel; Future; Expected date: 2025  -     Lipid Panel with Direct LDL reflex; Future; Expected date: 2025  3. Age-related osteoporosis without current pathological fracture  Assessment & Plan:  Continue endocrinology and Prolia.  4. Anxiety  Assessment & Plan:  Continue Prozac 40.  Uncontrolled so we will add 20 mg of Prozac to the 40.  Recheck with next visit.  Orders:  -     FLUoxetine (PROzac) 20 mg capsule; Take 1 capsule (20 mg total) by mouth daily  5. Other depression  -     FLUoxetine (PROzac) 20 mg capsule; Take 1 capsule (20 mg total) by mouth daily  -     CBC and differential; Future; Expected date: 2025  6. Elevated alkaline phosphatase level  -     Alkaline phosphatase, isoenzymes; Future  -     US abdomen complete; Future; Expected date: 2024  -     CBC and differential  -     Hepatic function panel; Future  7. Elevated LFTs  Assessment & Plan:  ALT elevated 93 AST normal with an increase in alk phos.  Check hepatitis panel.  Consider ultrasound abdomen.  Orders:  -     Chronic Hepatitis Panel  -     US abdomen complete; Future; Expected date: 2024  -     CBC and differential  -     Hepatic function panel; Future  8. Pain of upper abdomen  Assessment & Plan:  Intermittent without pattern roughly more to the right.   Elevated ALT and alk phos.  Check ultrasound abdomen.  Orders:  -     US abdomen complete; Future; Expected date: 08/06/2024  9. Subacute cough  Comments:  Per  has had cough which is getting worse and he is concerned. Non smoker.  Orders:  -     XR chest pa & lateral; Future; Expected date: 08/06/2024  10. Vitamin D deficiency  -     Vitamin D 25 hydroxy; Future; Expected date: 02/06/2025  11. Encounter for immunization  -     Pneumococcal Conjugate Vaccine 20-valent (Pcv20)      Depression Screening and Follow-up Plan: Patient was screened for depression during today's encounter. They screened negative with a PHQ-2 score of 0.    Urinary Incontinence Plan of Care: counseling topics discussed: practice Kegel (pelvic floor strengthening) exercises.       Preventive health issues were discussed with patient, and age appropriate screening tests were ordered as noted in patient's After Visit Summary. Personalized health advice and appropriate referrals for health education or preventive services given if needed, as noted in patient's After Visit Summary.    History of Present Illness       Angela Maya is here for chronic conditions f/u as well as welcome to Medicare visit. Pt. had labs done prior to today's visit which included Recent Results (from the past 672 hour(s))      Pt is c/o 3-4 months of upper abdominal discomfort random. NO N/V/D or change in BM. NO problems eating.     says she has been coughing a lot over the past few years and feels it is getting worse. Coughing all the time.     Does have heartburn with spicy foods. Takes OTC nexium at least once weekly as needed.      -Lipid Panel with Direct LDL reflex:   Collection Time: 07/31/24 10:54 AM       Result                      Value             Ref Range           Cholesterol                 199               See Comment *       Triglycerides               150 (H)           See Comment *       HDL, Direct                 40 (L)             >=50 mg/dL          LDL Calculated              129 (H)           0 - 100 mg/dL  -Comprehensive metabolic panel:   Collection Time: 07/31/24 10:54 AM       Result                      Value             Ref Range           Sodium                      141               135 - 147 mm*       Potassium                   3.3 (L)           3.5 - 5.3 mm*       Chloride                    103               96 - 108 mmo*       CO2                         30                21 - 32 mmol*       ANION GAP                   8                 4 - 13 mmol/L       BUN                         12                5 - 25 mg/dL        Creatinine                  0.68              0.60 - 1.30 *       Glucose, Fasting            98                65 - 99 mg/dL       Calcium                     9.4               8.4 - 10.2 m*       AST                         24                13 - 39 U/L         ALT                         93 (H)            7 - 52 U/L          Alkaline Phosphatase        248 (H)           34 - 104 U/L        Total Protein               7.5               6.4 - 8.4 g/*       Albumin                     4.0               3.5 - 5.0 g/*       Total Bilirubin             0.39              0.20 - 1.00 *       eGFR                        92                ml/min/1.73s*         Patient Care Team:  Elsa Chen PA-C as PCP - General (Family Medicine)  Elsa Chen PA-C as PCP - [facility]CP-[facility]  Franklin Garner MD as PCP - Endocrinology (Endocrinology)    Review of Systems   Constitutional: Negative.    HENT: Negative.     Eyes: Negative.    Respiratory: Negative.     Cardiovascular: Negative.    Gastrointestinal:  Positive for abdominal pain.   Endocrine: Negative.    Genitourinary: Negative.    Musculoskeletal: Negative.    Skin: Negative.    Allergic/Immunologic: Negative.    Neurological: Negative.    Hematological: Negative.    Psychiatric/Behavioral: Negative.       Medical History Reviewed by  provider this encounter:       Annual Wellness Visit Questionnaire   Angela is here for her Subsequent Wellness visit.     Health Risk Assessment:   Patient rates overall health as very good. Patient feels that their physical health rating is same. Patient is satisfied with their life. Eyesight was rated as same. Hearing was rated as slightly worse. Patient feels that their emotional and mental health rating is same. Patients states they are never, rarely angry. Patient states they are sometimes unusually tired/fatigued. Pain experienced in the last 7 days has been some. Patient's pain rating has been 7/10. Patient states that she has experienced no weight loss or gain in last 6 months. Upper abdominal pain    Depression Screening:   PHQ-2 Score: 0      Fall Risk Screening:   In the past year, patient has experienced: no history of falling in past year      Urinary Incontinence Screening:   Patient has leaked urine accidently in the last six months.     Home Safety:  Patient does not have trouble with stairs inside or outside of their home. Patient has working smoke alarms and has working carbon monoxide detector. Home safety hazards include: none.     Nutrition:   Current diet is Regular and No Added Salt.     Medications:   Patient is currently taking over-the-counter supplements. OTC medications include: see medication list. Patient is able to manage medications.     Activities of Daily Living (ADLs)/Instrumental Activities of Daily Living (IADLs):   Walk and transfer into and out of bed and chair?: Yes  Dress and groom yourself?: Yes    Bathe or shower yourself?: Yes    Feed yourself? Yes  Do your laundry/housekeeping?: Yes  Manage your money, pay your bills and track your expenses?: Yes  Make your own meals?: Yes    Do your own shopping?: Yes    Previous Hospitalizations:   Any hospitalizations or ED visits within the last 12 months?: No      Advance Care Planning:   Living will: Yes    Advanced directive: Yes     Advanced directive counseling given: Yes      Cognitive Screening:   Provider or family/friend/caregiver concerned regarding cognition?: No    PREVENTIVE SCREENINGS      Cardiovascular Screening:    General: Screening Not Indicated, History Lipid Disorder and Screening Current      Diabetes Screening:     General: Screening Current      Colorectal Cancer Screening:     General: Screening Current      Breast Cancer Screening:     General: Screening Current      Cervical Cancer Screening:    General: Screening Not Indicated      Osteoporosis Screening:    General: Screening Not Indicated, History Osteoporosis and Screening Current      Abdominal Aortic Aneurysm (AAA) Screening:        General: Screening Not Indicated      Lung Cancer Screening:     General: Screening Not Indicated      Hepatitis C Screening:    General: Screening Current    Screening, Brief Intervention, and Referral to Treatment (SBIRT)    Screening    Typical number of drinks in a week: 0    Single Item Drug Screening:  How often have you used an illegal drug (including marijuana) or a prescription medication for non-medical reasons in the past year? never    Single Item Drug Screen Score: 0  Interpretation: Negative screen for possible drug use disorder    Social Determinants of Health     Food Insecurity: No Food Insecurity (8/6/2024)    Hunger Vital Sign    • Worried About Running Out of Food in the Last Year: Never true    • Ran Out of Food in the Last Year: Never true   Transportation Needs: No Transportation Needs (8/6/2024)    PRAPARE - Transportation    • Lack of Transportation (Medical): No    • Lack of Transportation (Non-Medical): No   Housing Stability: Low Risk  (8/6/2024)    Housing Stability Vital Sign    • Unable to Pay for Housing in the Last Year: No    • Number of Times Moved in the Last Year: 0    • Homeless in the Last Year: No   Utilities: Not At Risk (8/6/2024)    [facility] Utilities    • Threatened with loss of utilities: No  "    Vision Screening    Right eye Left eye Both eyes   Without correction      With correction 20/30 202/30 20/25       Objective     /74 (BP Location: Left arm, Patient Position: Sitting, Cuff Size: Standard)   Pulse 84   Ht 5' 5.5\" (1.664 m)   Wt 75.5 kg (166 lb 6.4 oz)   SpO2 96%   BMI 27.27 kg/m²     Physical Exam  Vitals and nursing note reviewed.   Constitutional:       Appearance: Normal appearance. She is well-developed.   HENT:      Head: Normocephalic and atraumatic.   Eyes:      General: Lids are normal.      Conjunctiva/sclera: Conjunctivae normal.      Pupils: Pupils are equal, round, and reactive to light.   Cardiovascular:      Rate and Rhythm: Normal rate and regular rhythm.      Heart sounds: No murmur heard.  Pulmonary:      Effort: Pulmonary effort is normal.      Breath sounds: Normal breath sounds.   Abdominal:      General: Abdomen is protuberant. Bowel sounds are normal.      Palpations: Abdomen is soft.      Tenderness: There is abdominal tenderness in the right upper quadrant, epigastric area and left upper quadrant. There is no right CVA tenderness or left CVA tenderness.   Skin:     General: Skin is warm and dry.   Neurological:      General: No focal deficit present.      Mental Status: She is alert.      Coordination: Coordination is intact.   Psychiatric:         Mood and Affect: Mood normal.         Behavior: Behavior normal. Behavior is cooperative.         Thought Content: Thought content normal.         Judgment: Judgment normal.           "

## 2024-08-06 NOTE — ASSESSMENT & PLAN NOTE
Intermittent without pattern roughly more to the right.  Elevated ALT and alk phos.  Check ultrasound abdomen.

## 2024-08-06 NOTE — ASSESSMENT & PLAN NOTE
Patient is on Lipitor 40 and unfortunately her bad cholesterol has gone up to 129 and it was 117 last time and 105 prior to that.  Would consider increasing Lipitor or adding Zetia however at this time her AST is elevated as well as alk phos so we need to figure this out before we would make any medication adjustments.

## 2024-08-06 NOTE — ASSESSMENT & PLAN NOTE
ALT elevated 93 AST normal with an increase in alk phos.  Check hepatitis panel.  Consider ultrasound abdomen.

## 2024-08-07 ENCOUNTER — APPOINTMENT (OUTPATIENT)
Dept: RADIOLOGY | Facility: CLINIC | Age: 64
End: 2024-08-07
Payer: COMMERCIAL

## 2024-08-07 ENCOUNTER — LAB (OUTPATIENT)
Dept: LAB | Facility: CLINIC | Age: 64
End: 2024-08-07
Payer: COMMERCIAL

## 2024-08-07 DIAGNOSIS — R74.8 ELEVATED ALKALINE PHOSPHATASE LEVEL: ICD-10-CM

## 2024-08-07 DIAGNOSIS — R79.89 ELEVATED LFTS: ICD-10-CM

## 2024-08-07 DIAGNOSIS — R05.2 SUBACUTE COUGH: ICD-10-CM

## 2024-08-07 PROCEDURE — 71046 X-RAY EXAM CHEST 2 VIEWS: CPT

## 2024-08-07 NOTE — RESULT ENCOUNTER NOTE
Please let pt know that her quick urine dip in the office we ran after she left shows she has a UTI. Antibiotic was sent to her pharmacy. Thank you.

## 2024-08-08 LAB — BACTERIA UR CULT: ABNORMAL

## 2024-08-08 NOTE — RESULT ENCOUNTER NOTE
Please let pt know that urine culture does conclude that she has a UTI and that she should finish her macrobid antibiotic as directed. Thank you.

## 2024-08-09 ENCOUNTER — TELEPHONE (OUTPATIENT)
Dept: FAMILY MEDICINE CLINIC | Facility: CLINIC | Age: 64
End: 2024-08-09

## 2024-08-09 NOTE — TELEPHONE ENCOUNTER
----- Message from Elsa Chen PA-C sent at 8/8/2024  1:04 PM EDT -----  Please let pt know that urine culture does conclude that she has a UTI and that she should finish her macrobid antibiotic as directed. Thank you.

## 2024-08-12 ENCOUNTER — APPOINTMENT (OUTPATIENT)
Dept: LAB | Facility: CLINIC | Age: 64
End: 2024-08-12
Payer: MEDICARE

## 2024-08-12 LAB
ALBUMIN SERPL BCG-MCNC: 4.1 G/DL (ref 3.5–5)
ALP SERPL-CCNC: 152 U/L (ref 34–104)
ALT SERPL W P-5'-P-CCNC: 47 U/L (ref 7–52)
AST SERPL W P-5'-P-CCNC: 26 U/L (ref 13–39)
BASOPHILS # BLD AUTO: 0.1 THOUSANDS/ÂΜL (ref 0–0.1)
BASOPHILS NFR BLD AUTO: 1 % (ref 0–1)
BILIRUB DIRECT SERPL-MCNC: 0.1 MG/DL (ref 0–0.2)
BILIRUB SERPL-MCNC: 0.51 MG/DL (ref 0.2–1)
EOSINOPHIL # BLD AUTO: 0.24 THOUSAND/ÂΜL (ref 0–0.61)
EOSINOPHIL NFR BLD AUTO: 3 % (ref 0–6)
ERYTHROCYTE [DISTWIDTH] IN BLOOD BY AUTOMATED COUNT: 13.4 % (ref 11.6–15.1)
HBV CORE AB SER QL: NORMAL
HBV CORE IGM SER QL: NORMAL
HBV SURFACE AG SER QL: NORMAL
HCT VFR BLD AUTO: 39.3 % (ref 34.8–46.1)
HCV AB SER QL: NORMAL
HGB BLD-MCNC: 12.8 G/DL (ref 11.5–15.4)
IMM GRANULOCYTES # BLD AUTO: 0.03 THOUSAND/UL (ref 0–0.2)
IMM GRANULOCYTES NFR BLD AUTO: 0 % (ref 0–2)
LYMPHOCYTES # BLD AUTO: 3.37 THOUSANDS/ÂΜL (ref 0.6–4.47)
LYMPHOCYTES NFR BLD AUTO: 39 % (ref 14–44)
MCH RBC QN AUTO: 29.8 PG (ref 26.8–34.3)
MCHC RBC AUTO-ENTMCNC: 32.6 G/DL (ref 31.4–37.4)
MCV RBC AUTO: 92 FL (ref 82–98)
MONOCYTES # BLD AUTO: 0.56 THOUSAND/ÂΜL (ref 0.17–1.22)
MONOCYTES NFR BLD AUTO: 7 % (ref 4–12)
NEUTROPHILS # BLD AUTO: 4.37 THOUSANDS/ÂΜL (ref 1.85–7.62)
NEUTS SEG NFR BLD AUTO: 50 % (ref 43–75)
NRBC BLD AUTO-RTO: 0 /100 WBCS
PLATELET # BLD AUTO: 349 THOUSANDS/UL (ref 149–390)
PMV BLD AUTO: 9.9 FL (ref 8.9–12.7)
PROT SERPL-MCNC: 7.6 G/DL (ref 6.4–8.4)
RBC # BLD AUTO: 4.29 MILLION/UL (ref 3.81–5.12)
WBC # BLD AUTO: 8.67 THOUSAND/UL (ref 4.31–10.16)

## 2024-08-12 PROCEDURE — 80076 HEPATIC FUNCTION PANEL: CPT

## 2024-08-12 PROCEDURE — 86803 HEPATITIS C AB TEST: CPT | Performed by: PHYSICIAN ASSISTANT

## 2024-08-12 PROCEDURE — 85025 COMPLETE CBC W/AUTO DIFF WBC: CPT | Performed by: PHYSICIAN ASSISTANT

## 2024-08-12 PROCEDURE — 86704 HEP B CORE ANTIBODY TOTAL: CPT | Performed by: PHYSICIAN ASSISTANT

## 2024-08-12 PROCEDURE — 87340 HEPATITIS B SURFACE AG IA: CPT | Performed by: PHYSICIAN ASSISTANT

## 2024-08-12 PROCEDURE — 84075 ASSAY ALKALINE PHOSPHATASE: CPT

## 2024-08-12 PROCEDURE — 36415 COLL VENOUS BLD VENIPUNCTURE: CPT

## 2024-08-12 PROCEDURE — 86705 HEP B CORE ANTIBODY IGM: CPT | Performed by: PHYSICIAN ASSISTANT

## 2024-08-12 PROCEDURE — 84080 ASSAY ALKALINE PHOSPHATASES: CPT

## 2024-08-15 ENCOUNTER — TELEPHONE (OUTPATIENT)
Dept: FAMILY MEDICINE CLINIC | Facility: CLINIC | Age: 64
End: 2024-08-15

## 2024-08-15 LAB
ALP BONE CFR SERPL: 14 % (ref 14–68)
ALP INTEST CFR SERPL: 1 % (ref 0–18)
ALP LIVER CFR SERPL: 85 % (ref 18–85)
ALP SERPL-CCNC: 173 IU/L (ref 44–121)

## 2024-08-15 NOTE — RESULT ENCOUNTER NOTE
Please let pt know that her alk phos level is still elevated but the rest of of labs were normal. I still need her to go for the US abd. Which was ordered at the last visit. Thank you.

## 2024-08-15 NOTE — TELEPHONE ENCOUNTER
Relayed results to patient as per provider message. Patient expressed understanding and did not have any further questions.   She will call to schedule US abdomen.

## 2024-08-15 NOTE — TELEPHONE ENCOUNTER
----- Message from Elsa Chen PA-C sent at 8/15/2024  9:02 AM EDT -----  Please let pt know that her alk phos level is still elevated but the rest of of labs were normal. I still need her to go for the US abd. Which was ordered at the last visit. Thank you.

## 2024-08-20 ENCOUNTER — OFFICE VISIT (OUTPATIENT)
Dept: ENDOCRINOLOGY | Facility: CLINIC | Age: 64
End: 2024-08-20
Payer: MEDICARE

## 2024-08-20 VITALS
OXYGEN SATURATION: 98 % | SYSTOLIC BLOOD PRESSURE: 120 MMHG | HEART RATE: 78 BPM | WEIGHT: 163.4 LBS | HEIGHT: 65 IN | DIASTOLIC BLOOD PRESSURE: 80 MMHG | BODY MASS INDEX: 27.22 KG/M2

## 2024-08-20 DIAGNOSIS — E55.9 VITAMIN D DEFICIENCY: ICD-10-CM

## 2024-08-20 DIAGNOSIS — M81.0 AGE-RELATED OSTEOPOROSIS WITHOUT CURRENT PATHOLOGICAL FRACTURE: Primary | ICD-10-CM

## 2024-08-20 PROCEDURE — 99214 OFFICE O/P EST MOD 30 MIN: CPT

## 2024-08-20 PROCEDURE — 96372 THER/PROPH/DIAG INJ SC/IM: CPT

## 2024-08-20 NOTE — PROGRESS NOTES
"Assessment/Plan:    Angela Maya came into the [facility] Endocrinology Office today 08/20/24 to receive Prolia injection.      Verbal consent obtained.  Consent given by: patient    patient states patient has been medically healthy with no underlining concerns/complications.      Angela Maya presents with no symptoms today.       All insturctions were reviewed with the patient.    If the patient should have any questions/concerns, advised patient to contacted [facility] Endocrinology Office.       Subjective:     History provided by: patient    Patient ID: Angela Maya is a 64 y.o. female      Objective:    Vitals:    08/20/24 1505   BP: 120/80   Pulse: 78   SpO2: 98%   Weight: 74.1 kg (163 lb 6.4 oz)   Height: 5' 5\" (1.651 m)       Patient tolerated the injection well without any complications.  Injection site/s right arm.  Medication was provided by office.    Patient signed consent form yes   Patient signed ABN form yes (If no patient is not a medicare patient).   Patient waited 15 minutes after injection no (This only applies to patient's receiving first time injection).       Last Visit: Visit date not found  Next visit:Visit date not found     "

## 2024-08-20 NOTE — ASSESSMENT & PLAN NOTE
She will receive Prolia today. Continue with calcium and vitamin d supplementation. Due for DXA in June 2025. Reviewed importance of regular exercise and fall prevention.

## 2024-08-23 ENCOUNTER — HOSPITAL ENCOUNTER (OUTPATIENT)
Dept: ULTRASOUND IMAGING | Facility: MEDICAL CENTER | Age: 64
Discharge: HOME | End: 2024-08-23
Payer: MEDICARE

## 2024-08-23 DIAGNOSIS — R74.8 ELEVATED ALKALINE PHOSPHATASE LEVEL: ICD-10-CM

## 2024-08-23 DIAGNOSIS — R10.10 PAIN OF UPPER ABDOMEN: ICD-10-CM

## 2024-08-23 DIAGNOSIS — R79.89 ELEVATED LFTS: ICD-10-CM

## 2024-08-23 PROCEDURE — 76700 US EXAM ABDOM COMPLETE: CPT

## 2024-09-05 ENCOUNTER — TELEPHONE (OUTPATIENT)
Dept: FAMILY MEDICINE CLINIC | Facility: CLINIC | Age: 64
End: 2024-09-05

## 2024-09-05 NOTE — TELEPHONE ENCOUNTER
----- Message from Elsa Chen PA-C sent at 9/4/2024  3:39 PM EDT -----  Please let pt know that I reviewed her case with one of my supervising physicians and due to the fact that her US was normal and her alk phos iso-enzymes were normal there is no further follow up needed. We will keep her routine 6 months labs for when they are already set for next year. Thank you!

## 2024-09-05 NOTE — TELEPHONE ENCOUNTER
LMOM for patient to call back if any questions or concerns arise in reference to the lab results and plan of care. Patient had seen results via [employer].

## 2024-09-17 NOTE — PROGRESS NOTES
Established Patient Progress Note      Chief Complaint   Patient presents with    Osteoporosis        Impression & Plan:    Problem List Items Addressed This Visit          Musculoskeletal and Integument    Age-related osteoporosis without current pathological fracture - Primary     She will receive Prolia today. Continue with calcium and vitamin d supplementation. Due for DXA in June 2025. Reviewed importance of regular exercise and fall prevention.          Relevant Orders    Comprehensive metabolic panel       Other    Vitamin D deficiency     Continue with supplementation.             History of Present Illness:   Angela Maya is a 64 y.o. female with osteoporosis, vitamin-D deficiency and hyperlipidemia. For osteoporosis she is on Prolia every 6 months. DEXA scan done 6/2021 showed worsening of bone density, in which Prolia injections were started 7/2021. Last injection was 2/16/24.     For vitamin-D deficiency, taking 2,000 units daily. She gets calcium through her diet. Denies recent fracture, falls, kidney stones, or jaw pain.       Patient Active Problem List   Diagnosis    Allergic rhinitis    Anxiety    Mixed hyperlipidemia    Vitamin D deficiency    Age-related osteoporosis without current pathological fracture    Asymptomatic menopause    Primary insomnia    Elevated LFTs    Elevated alkaline phosphatase level    Abdominal pain      Past Medical History:   Diagnosis Date    Anxiety     Hypercholesteremia       History reviewed. No pertinent surgical history.   Family History   Problem Relation Age of Onset    Heart attack Mother     Arthritis Mother     Diabetes Mother     Hypertension Mother     Hypothyroidism Mother     Diabetes type I Mother     Aneurysm Father         Abd Aorta    Heart disease Father         CABG    No Known Problems Maternal Grandmother     No Known Problems Maternal Grandfather     Anxiety disorder Paternal Grandmother     No Known Problems Paternal Grandfather     Lung cancer  no rashes , no jaundice present , good turgor , no masses , no tenderness on palpation "Paternal Aunt         age unknown    Breast cancer Neg Hx      Social History     Tobacco Use    Smoking status: Never    Smokeless tobacco: Never   Substance Use Topics    Alcohol use: Yes     Alcohol/week: 1.0 standard drink of alcohol     Types: 1 Glasses of wine per week     Comment: social     Allergies   Allergen Reactions    Augmentin [Amoxicillin-Pot Clavulanate] Abdominal Pain         Current Outpatient Medications:     ALPRAZolam (XANAX) 0.5 mg tablet, Take 1 tablet (0.5 mg total) by mouth 2 (two) times a day as needed for anxiety, Disp: 30 tablet, Rfl: 0    aspirin 81 MG tablet, Take 1 tablet by mouth daily, Disp: , Rfl:     atorvastatin (LIPITOR) 40 mg tablet, Take 1 tablet (40 mg total) by mouth daily, Disp: 90 tablet, Rfl: 0    Cholecalciferol 50 MCG (2000 UT) CAPS, Take 1 capsule by mouth daily, Disp: , Rfl:     Cyanocobalamin (VITAMIN B12 PO), Take by mouth, Disp: , Rfl:     FLUoxetine (PROzac) 20 mg capsule, Take 1 capsule (20 mg total) by mouth daily, Disp: 90 capsule, Rfl: 3    FLUoxetine (PROzac) 40 MG capsule, TAKE 1 CAPSULE DAILY, Disp: 90 capsule, Rfl: 1    Multiple Vitamin (DAILY VALUE MULTIVITAMIN) TABS, Take 1 tablet by mouth daily, Disp: , Rfl:     Omega-3 Fatty Acids (FISH OIL) 600 MG CAPS, Take by mouth, Disp: , Rfl:     vitamin E, tocopherol, 400 units capsule, Take by mouth, Disp: , Rfl:     Current Facility-Administered Medications:     denosumab (PROLIA) subcutaneous injection 60 mg, 60 mg, Subcutaneous, Q6 Months, Franklin Garner MD, 60 mg at 02/16/24 1418    Review of Systems   Constitutional:  Negative for chills and fever.   Musculoskeletal:  Negative for arthralgias, back pain and gait problem.   Neurological:  Negative for weakness.   All other systems reviewed and are negative.      Physical Exam:  Body mass index is 27.19 kg/m².  /80   Pulse 78   Ht 5' 5\" (1.651 m)   Wt 74.1 kg (163 lb 6.4 oz)   SpO2 98%   BMI 27.19 kg/m²    Wt Readings from Last 3 Encounters: "   08/20/24 74.1 kg (163 lb 6.4 oz)   08/06/24 75.5 kg (166 lb 6.4 oz)   01/30/24 73.5 kg (162 lb)       Physical Exam  Vitals reviewed.   Constitutional:       Appearance: Normal appearance.   Cardiovascular:      Rate and Rhythm: Normal rate.   Pulmonary:      Effort: Pulmonary effort is normal.   Neurological:      Mental Status: She is alert and oriented to person, place, and time.   Psychiatric:         Mood and Affect: Mood normal.         Thought Content: Thought content normal.         Labs:     Component      Latest Ref Rng 7/31/2024   Sodium      135 - 147 mmol/L 141    Potassium      3.5 - 5.3 mmol/L 3.3 (L)    Chloride      96 - 108 mmol/L 103    Carbon Dioxide      21 - 32 mmol/L 30    ANION GAP      4 - 13 mmol/L 8    BUN      5 - 25 mg/dL 12    Creatinine      0.60 - 1.30 mg/dL 0.68    GLUCOSE, FASTING      65 - 99 mg/dL 98    Calcium      8.4 - 10.2 mg/dL 9.4    AST      13 - 39 U/L 24    ALT      7 - 52 U/L 93 (H)    ALK PHOS      34 - 104 U/L 248 (H)    Total Protein      6.4 - 8.4 g/dL 7.5    Albumin      3.5 - 5.0 g/dL 4.0    Total Bilirubin      0.20 - 1.00 mg/dL 0.39    GFR, Calculated      ml/min/1.73sq m 92       Legend:  (L) Low  (H) High  Orders Placed This Encounter   Procedures    Comprehensive metabolic panel     This is a patient instruction: Patient fasting for 8 hours or longer recommended.     Standing Status:   Future     Standing Expiration Date:   8/20/2025       There are no Patient Instructions on file for this visit.    Discussed with the patient and all questioned fully answered. She will call me if any problems arise.    DAVON Velasquez

## 2024-10-12 DIAGNOSIS — E78.2 MIXED HYPERLIPIDEMIA: ICD-10-CM

## 2024-10-14 RX ORDER — ATORVASTATIN CALCIUM 40 MG/1
40 TABLET, FILM COATED ORAL DAILY
Qty: 90 TABLET | Refills: 0 | Status: SHIPPED | OUTPATIENT
Start: 2024-10-14

## 2024-10-26 DIAGNOSIS — E78.2 MIXED HYPERLIPIDEMIA: ICD-10-CM

## 2024-10-28 RX ORDER — ATORVASTATIN CALCIUM 40 MG/1
40 TABLET, FILM COATED ORAL DAILY
Qty: 90 TABLET | Refills: 3 | Status: SHIPPED | OUTPATIENT
Start: 2024-10-28 | End: 2024-11-05 | Stop reason: SDUPTHER

## 2024-10-28 NOTE — TELEPHONE ENCOUNTER
Requested Prescriptions     Pending Prescriptions Disp Refills    atorvastatin (LIPITOR) 40 mg tablet 90 tablet 0     Sig: Take 1 tablet (40 mg total) by mouth daily      LOV 6/6/24 F/U 2/11/25 Labs Active

## 2024-11-05 DIAGNOSIS — E78.2 MIXED HYPERLIPIDEMIA: ICD-10-CM

## 2024-11-06 RX ORDER — ATORVASTATIN CALCIUM 40 MG/1
40 TABLET, FILM COATED ORAL DAILY
Qty: 90 TABLET | Refills: 1 | Status: SHIPPED | OUTPATIENT
Start: 2024-11-06

## 2024-11-15 DIAGNOSIS — F32.89 OTHER DEPRESSION: ICD-10-CM

## 2024-11-15 RX ORDER — FLUOXETINE 40 MG/1
40 CAPSULE ORAL DAILY
Qty: 90 CAPSULE | Refills: 1 | Status: SHIPPED | OUTPATIENT
Start: 2024-11-15

## 2025-02-03 ENCOUNTER — RA CDI HCC (OUTPATIENT)
Dept: OTHER | Facility: HOSPITAL | Age: 65
End: 2025-02-03

## 2025-02-05 ENCOUNTER — TELEPHONE (OUTPATIENT)
Dept: ENDOCRINOLOGY | Facility: CLINIC | Age: 65
End: 2025-02-05

## 2025-02-07 DIAGNOSIS — Z00.6 ENCOUNTER FOR EXAMINATION FOR NORMAL COMPARISON OR CONTROL IN CLINICAL RESEARCH PROGRAM: ICD-10-CM

## 2025-02-10 ENCOUNTER — TELEPHONE (OUTPATIENT)
Dept: ADMINISTRATIVE | Facility: OTHER | Age: 65
End: 2025-02-10

## 2025-02-10 NOTE — TELEPHONE ENCOUNTER
02/10/25 2:53 PM    Patient contacted to bring Advance Directive, POLST, or Living Will document to next scheduled pcp visit.[facility] Department left message.    Thank you.  Myra Archer MA  [facility]

## 2025-02-11 ENCOUNTER — OFFICE VISIT (OUTPATIENT)
Dept: FAMILY MEDICINE CLINIC | Facility: CLINIC | Age: 65
End: 2025-02-11
Payer: MEDICARE

## 2025-02-11 VITALS
BODY MASS INDEX: 28.12 KG/M2 | SYSTOLIC BLOOD PRESSURE: 124 MMHG | HEIGHT: 65 IN | DIASTOLIC BLOOD PRESSURE: 72 MMHG | WEIGHT: 168.8 LBS | HEART RATE: 85 BPM | OXYGEN SATURATION: 93 %

## 2025-02-11 DIAGNOSIS — E78.2 MIXED HYPERLIPIDEMIA: ICD-10-CM

## 2025-02-11 DIAGNOSIS — R74.8 ELEVATED ALKALINE PHOSPHATASE LEVEL: ICD-10-CM

## 2025-02-11 DIAGNOSIS — M81.0 AGE-RELATED OSTEOPOROSIS WITHOUT CURRENT PATHOLOGICAL FRACTURE: ICD-10-CM

## 2025-02-11 DIAGNOSIS — Z12.31 ENCOUNTER FOR SCREENING MAMMOGRAM FOR MALIGNANT NEOPLASM OF BREAST: Primary | ICD-10-CM

## 2025-02-11 DIAGNOSIS — F41.9 ANXIETY: ICD-10-CM

## 2025-02-11 DIAGNOSIS — E55.9 VITAMIN D DEFICIENCY: ICD-10-CM

## 2025-02-11 PROCEDURE — 99214 OFFICE O/P EST MOD 30 MIN: CPT | Performed by: PHYSICIAN ASSISTANT

## 2025-02-11 PROCEDURE — G2211 COMPLEX E/M VISIT ADD ON: HCPCS | Performed by: PHYSICIAN ASSISTANT

## 2025-02-11 NOTE — PATIENT INSTRUCTIONS
1. Encounter for screening mammogram for malignant neoplasm of breast  -     Mammo screening bilateral w 3d and cad; Future  2. Anxiety  Assessment & Plan:  Patient feels Prozac is doing well for her. No changes at this time.        3. Age-related osteoporosis without current pathological fracture  Assessment & Plan:  Patient gets prolia injections every 6 months. Follows with endocrinology. She is due for her next injection 2/21/25. Orders placed for DEXA scan, last one in 2023 showed improvement in her osteoporosis.        4. Mixed hyperlipidemia  Assessment & Plan:  Patient did not get blood work done prior to today's visit. Paper copies of blood work given. Instructed patient to go soon for current blood work. Continue on atorvastatin. Will follow up regarding blood work results via [employer].           5. Vitamin D deficiency  Assessment & Plan:  Patient did not go for blood work prior to today's visit. Paper copies of blood work give. Instructed patient to go soon for current blood work. Continue taking 2000IU vitamin D daily. Will follow up regarding blood work results via [employer].        6. Elevated alkaline phosphatase level  Assessment & Plan:  Patient did not get her interim CMP done since the last alk phos was drawn.  Hepatitis was negative isoenzymes are negative however alk phos was still elevated.  Upon recheck we will determine if patient needs any further workup.

## 2025-02-11 NOTE — ASSESSMENT & PLAN NOTE
Patient gets prolia injections every 6 months. Follows with endocrinology. She is due for her next injection 2/21/25. Orders placed for DEXA scan, last one in 2023 showed improvement in her osteoporosis.

## 2025-02-11 NOTE — PROGRESS NOTES
Name: Angela Maya      : 1960      MRN: [id number]  Encounter Provider: Elsa Chen PA-C  Encounter Date: 2025   Encounter department: [facility]  :  Assessment & Plan  Encounter for screening mammogram for malignant neoplasm of breast  Patient's last mammogram was in . Orders placed for future.   Orders:  •  Mammo screening bilateral w 3d and cad; Future    Anxiety  Patient feels Prozac is doing well for her. No changes at this time.        Age-related osteoporosis without current pathological fracture  Patient gets prolia injections every 6 months. Follows with endocrinology. She is due for her next injection 25. Orders placed for DEXA scan, last one in  showed improvement in her osteoporosis.        Mixed hyperlipidemia  Patient did not get blood work done prior to today's visit. Paper copies of blood work given. Instructed patient to go soon for current blood work. Continue on atorvastatin. Will follow up regarding blood work results via [employer]. IF stable recheck in 6 months.          Vitamin D deficiency  Patient did not go for blood work prior to today's visit. Paper copies of blood work give. Instructed patient to go soon for current blood work. Continue taking 2000IU vitamin D daily. Will follow up regarding blood work results via [employer].        Elevated alkaline phosphatase level  Patient did not get her interim CMP done since the last alk phos was drawn.  Hepatitis was negative isoenzymes are negative however alk phos was still elevated.  Upon recheck we will determine if patient needs any further workup.             Depression Screening and Follow-up Plan: Patient was screened for depression during today's encounter. They screened negative with a PHQ-2 score of 0.        History of Present Illness   Patient presents with:  Follow-up: 6 months follow up     Presenting today with . Patient did not get blood work done prior to today's visit.  "Offers no complaints. Declining all vaccines except shingles vaccine which has to be done at pharmacy.       Review of Systems   Constitutional:  Negative for chills and fever.   HENT:  Negative for ear pain and sore throat.    Eyes:  Negative for pain and visual disturbance.   Respiratory:  Negative for cough and shortness of breath.    Cardiovascular:  Negative for chest pain and palpitations.   Gastrointestinal:  Negative for abdominal pain and vomiting.   Genitourinary:  Negative for dysuria and hematuria.   Musculoskeletal:  Negative for arthralgias and back pain.   Skin:  Negative for color change and rash.   Neurological:  Negative for seizures and syncope.   All other systems reviewed and are negative.      Objective   /72 (BP Location: Left arm, Patient Position: Sitting, Cuff Size: Standard)   Pulse 85   Ht 5' 5\" (1.651 m)   Wt 76.6 kg (168 lb 12.8 oz)   SpO2 93%   BMI 28.09 kg/m²      Physical Exam  Vitals and nursing note reviewed.   Constitutional:       General: She is not in acute distress.     Appearance: Normal appearance. She is well-developed.   HENT:      Head: Normocephalic and atraumatic.   Eyes:      General: Lids are normal.      Conjunctiva/sclera: Conjunctivae normal.      Pupils: Pupils are equal, round, and reactive to light.   Cardiovascular:      Rate and Rhythm: Normal rate and regular rhythm.      Heart sounds: No murmur heard.  Pulmonary:      Effort: Pulmonary effort is normal. No respiratory distress.      Breath sounds: Normal breath sounds.   Abdominal:      Palpations: Abdomen is soft.      Tenderness: There is no abdominal tenderness.   Musculoskeletal:         General: No swelling.      Cervical back: Neck supple.   Skin:     General: Skin is warm and dry.      Capillary Refill: Capillary refill takes less than 2 seconds.   Neurological:      General: No focal deficit present.      Mental Status: She is alert.      Coordination: Coordination is intact. "   Psychiatric:         Mood and Affect: Mood normal.         Behavior: Behavior normal. Behavior is cooperative.         Thought Content: Thought content normal.         Judgment: Judgment normal.

## 2025-02-11 NOTE — ASSESSMENT & PLAN NOTE
Patient did not get blood work done prior to today's visit. Paper copies of blood work given. Instructed patient to go soon for current blood work. Continue on atorvastatin. Will [facility] IF stable recheck in 6 months.

## 2025-02-11 NOTE — ASSESSMENT & PLAN NOTE
Patient did not go for blood work prior to today's visit. Paper copies of blood work give. Instructed patient to go soon for current blood work. Continue taking 2000IU vitamin D daily. Will follow up regarding blood work results via [employer].

## 2025-02-11 NOTE — ASSESSMENT & PLAN NOTE
Patient did not get her interim CMP done since the last alk phos was drawn.  Hepatitis was negative isoenzymes are negative however alk phos was still elevated.  Upon recheck we will determine if patient needs any further workup.

## 2025-02-11 NOTE — PROGRESS NOTES
"Name: Angela Maya      : 1960      MRN: [id number]  Encounter Provider: Elsa Chen PA-C  Encounter Date: 2025   Encounter department: [facility]  :  Assessment & Plan  Encounter for screening mammogram for malignant neoplasm of breast    Orders:  •  Mammo screening bilateral w 3d and cad; Future    Anxiety         Age-related osteoporosis without current pathological fracture         Mixed hyperlipidemia         Vitamin D deficiency                History of Present Illness {?Quick Links Encounters * My Last Note * Last Note in Specialty * Snapshot * Since Last Visit * History :78957}  HPI  Review of Systems    Objective {?Quick Links Trend Vitals * Enter New Vitals * Results Review * Timeline (Adult) * Labs * Imaging * Cardiology * Procedures * Lung Cancer Screening * Surgical eConsent :47110}  /72 (BP Location: Left arm, Patient Position: Sitting, Cuff Size: Standard)   Pulse 85   Ht 5' 5\" (1.651 m)   Wt 76.6 kg (168 lb 12.8 oz)   SpO2 93%   BMI 28.09 kg/m²      Physical Exam  {Administrative / Billing Section (Optional):77265}  "

## 2025-02-18 ENCOUNTER — TELEPHONE (OUTPATIENT)
Dept: ENDOCRINOLOGY | Facility: CLINIC | Age: 65
End: 2025-02-18

## 2025-02-19 ENCOUNTER — APPOINTMENT (OUTPATIENT)
Dept: LAB | Facility: CLINIC | Age: 65
End: 2025-02-19
Payer: MEDICARE

## 2025-02-19 DIAGNOSIS — E55.9 VITAMIN D DEFICIENCY: ICD-10-CM

## 2025-02-19 DIAGNOSIS — F32.89 OTHER DEPRESSION: ICD-10-CM

## 2025-02-19 DIAGNOSIS — E78.2 MIXED HYPERLIPIDEMIA: ICD-10-CM

## 2025-02-19 DIAGNOSIS — M81.0 AGE-RELATED OSTEOPOROSIS WITHOUT CURRENT PATHOLOGICAL FRACTURE: ICD-10-CM

## 2025-02-19 LAB
ALBUMIN SERPL BCG-MCNC: 4.4 G/DL (ref 3.5–5)
ALP SERPL-CCNC: 88 U/L (ref 34–104)
ALT SERPL W P-5'-P-CCNC: 30 U/L (ref 7–52)
ANION GAP SERPL CALCULATED.3IONS-SCNC: 7 MMOL/L (ref 4–13)
AST SERPL W P-5'-P-CCNC: 22 U/L (ref 13–39)
BASOPHILS # BLD AUTO: 0.09 THOUSANDS/ΜL (ref 0–0.1)
BASOPHILS NFR BLD AUTO: 1 % (ref 0–1)
BILIRUB SERPL-MCNC: 0.69 MG/DL (ref 0.2–1)
BUN SERPL-MCNC: 15 MG/DL (ref 5–25)
CALCIUM SERPL-MCNC: 9.7 MG/DL (ref 8.4–10.2)
CHLORIDE SERPL-SCNC: 102 MMOL/L (ref 96–108)
CHOLEST SERPL-MCNC: 231 MG/DL (ref ?–200)
CO2 SERPL-SCNC: 29 MMOL/L (ref 21–32)
CREAT SERPL-MCNC: 0.8 MG/DL (ref 0.6–1.3)
EOSINOPHIL # BLD AUTO: 0.16 THOUSAND/ΜL (ref 0–0.61)
EOSINOPHIL NFR BLD AUTO: 2 % (ref 0–6)
ERYTHROCYTE [DISTWIDTH] IN BLOOD BY AUTOMATED COUNT: 13.3 % (ref 11.6–15.1)
GFR SERPL CREATININE-BSD FRML MDRD: 77 ML/MIN/1.73SQ M
GLUCOSE P FAST SERPL-MCNC: 95 MG/DL (ref 65–99)
HCT VFR BLD AUTO: 41 % (ref 34.8–46.1)
HDLC SERPL-MCNC: 65 MG/DL
HGB BLD-MCNC: 13.1 G/DL (ref 11.5–15.4)
IMM GRANULOCYTES # BLD AUTO: 0.01 THOUSAND/UL (ref 0–0.2)
IMM GRANULOCYTES NFR BLD AUTO: 0 % (ref 0–2)
LDLC SERPL CALC-MCNC: 133 MG/DL (ref 0–100)
LYMPHOCYTES # BLD AUTO: 3.39 THOUSANDS/ΜL (ref 0.6–4.47)
LYMPHOCYTES NFR BLD AUTO: 43 % (ref 14–44)
MCH RBC QN AUTO: 29.8 PG (ref 26.8–34.3)
MCHC RBC AUTO-ENTMCNC: 32 G/DL (ref 31.4–37.4)
MCV RBC AUTO: 93 FL (ref 82–98)
MONOCYTES # BLD AUTO: 0.57 THOUSAND/ΜL (ref 0.17–1.22)
MONOCYTES NFR BLD AUTO: 7 % (ref 4–12)
NEUTROPHILS # BLD AUTO: 3.75 THOUSANDS/ΜL (ref 1.85–7.62)
NEUTS SEG NFR BLD AUTO: 47 % (ref 43–75)
NRBC BLD AUTO-RTO: 0 /100 WBCS
PLATELET # BLD AUTO: 305 THOUSANDS/UL (ref 149–390)
PMV BLD AUTO: 9.4 FL (ref 8.9–12.7)
POTASSIUM SERPL-SCNC: 3.9 MMOL/L (ref 3.5–5.3)
PROT SERPL-MCNC: 7.3 G/DL (ref 6.4–8.4)
RBC # BLD AUTO: 4.4 MILLION/UL (ref 3.81–5.12)
SODIUM SERPL-SCNC: 138 MMOL/L (ref 135–147)
TRIGL SERPL-MCNC: 166 MG/DL (ref ?–150)
WBC # BLD AUTO: 7.97 THOUSAND/UL (ref 4.31–10.16)

## 2025-02-19 PROCEDURE — 80061 LIPID PANEL: CPT

## 2025-02-19 PROCEDURE — 36415 COLL VENOUS BLD VENIPUNCTURE: CPT

## 2025-02-19 PROCEDURE — 85025 COMPLETE CBC W/AUTO DIFF WBC: CPT

## 2025-02-19 PROCEDURE — 80053 COMPREHEN METABOLIC PANEL: CPT

## 2025-02-19 PROCEDURE — 82306 VITAMIN D 25 HYDROXY: CPT

## 2025-02-20 ENCOUNTER — RESULTS FOLLOW-UP (OUTPATIENT)
Dept: FAMILY MEDICINE CLINIC | Facility: CLINIC | Age: 65
End: 2025-02-20

## 2025-02-20 DIAGNOSIS — E78.2 MIXED HYPERLIPIDEMIA: Primary | ICD-10-CM

## 2025-02-20 LAB — 25(OH)D3 SERPL-MCNC: 40.9 NG/ML (ref 30–100)

## 2025-02-20 NOTE — TELEPHONE ENCOUNTER
----- Message from Elsa Chen PA-C sent at 2/20/2025 11:38 AM EST -----  Please let pt know her trigs and LDL remain slightly elevated even on 40 mg of lipitor. Recommend decreasing fat/chol in diet and I have ordered repeat labs to do in 6 months before her next apt. Thank you.

## 2025-02-20 NOTE — RESULT ENCOUNTER NOTE
Please let pt know her trigs and LDL remain slightly elevated even on 40 mg of lipitor. Recommend decreasing fat/chol in diet and I have ordered repeat labs to do in 6 months before her next apt. Thank you.

## 2025-02-20 NOTE — TELEPHONE ENCOUNTER
Left detailed message advising patient of results and plan of care by pcp. Patient has also seen results via [employer]. Last read by Angela Maya at 11:58AM on 2/20/2025.

## 2025-02-21 ENCOUNTER — OFFICE VISIT (OUTPATIENT)
Dept: ENDOCRINOLOGY | Facility: CLINIC | Age: 65
End: 2025-02-21
Payer: MEDICARE

## 2025-02-21 VITALS
DIASTOLIC BLOOD PRESSURE: 80 MMHG | HEIGHT: 65 IN | SYSTOLIC BLOOD PRESSURE: 120 MMHG | OXYGEN SATURATION: 98 % | HEART RATE: 68 BPM | BODY MASS INDEX: 27.49 KG/M2 | WEIGHT: 165 LBS

## 2025-02-21 DIAGNOSIS — M81.0 AGE-RELATED OSTEOPOROSIS WITHOUT CURRENT PATHOLOGICAL FRACTURE: Primary | ICD-10-CM

## 2025-02-21 PROCEDURE — 96372 THER/PROPH/DIAG INJ SC/IM: CPT

## 2025-02-21 PROCEDURE — 99213 OFFICE O/P EST LOW 20 MIN: CPT

## 2025-02-21 PROCEDURE — G2211 COMPLEX E/M VISIT ADD ON: HCPCS

## 2025-02-21 NOTE — ASSESSMENT & PLAN NOTE
She will receive Prolia today. Continue with dietary calcium and vitamin d supplementation. She will be due for DXA in June.   Orders:    DXA bone density spine hip and pelvis; Future    Comprehensive metabolic panel; Future

## 2025-02-21 NOTE — PROGRESS NOTES
"Assessment/Plan:    Angela Maya came into the [facility] today 02/21/25 to receive Prolia injection.      Verbal consent obtained.  Consent given by: patient    patient states patient has been medically healthy with no underlining concerns/complications.      Angela Maya presents with no symptoms today.       All insturctions were reviewed with the patient.    If the patient should have any questions/concerns, advised patient to contacted [facility].       Subjective:     History provided by: patient    Patient ID: Angela Maya is a 64 y.o. female      Objective:    Vitals:    02/21/25 1255   BP: 120/80   Pulse: 68   SpO2: 98%   Weight: 74.8 kg (165 lb)   Height: 5' 5\" (1.651 m)       Patient tolerated the injection well without any complications.  Injection site/s right arm.  Medication was provided by office.    Patient signed consent form yes   Patient signed ABN form yes (If no patient is not a medicare patient).   Patient waited 15 minutes after injection no (This only applies to patient's receiving first time injection).       Last Visit: 2/18/2025  Next visit:Visit date not found      "

## 2025-02-21 NOTE — PROGRESS NOTES
Established Patient Progress Note      Chief Complaint   Patient presents with    Osteoporosis        Impression & Plan:    Assessment & Plan  Age-related osteoporosis without current pathological fracture  She will receive Prolia today. Continue with dietary calcium and vitamin d supplementation. She will be due for DXA in June.   Orders:    DXA bone density spine hip and pelvis; Future    Comprehensive metabolic panel; Future      History of Present Illness:   Angela Maya is a 64 y.o. female with Angela Maya is a 64 y.o. female with osteoporosis, vitamin-D deficiency and hyperlipidemia. For osteoporosis she is on Prolia every 6 months. DEXA scan done 6/2021 showed worsening of bone density, in which Prolia injections were started 7/2021. Last injection was 8/20/24. She is tolerating well without any issues.      For vitamin-D deficiency, taking 2,000 units daily. She gets calcium through her diet. Denies recent fracture, falls, kidney stones, or jaw pain.      Patient Active Problem List   Diagnosis    Allergic rhinitis    Anxiety    Mixed hyperlipidemia    Vitamin D deficiency    Age-related osteoporosis without current pathological fracture    Asymptomatic menopause    Primary insomnia    Elevated LFTs    Elevated alkaline phosphatase level    Abdominal pain      Past Medical History:   Diagnosis Date    Anxiety     Hypercholesteremia       History reviewed. No pertinent surgical history.   Family History   Problem Relation Age of Onset    Heart attack Mother     Arthritis Mother     Diabetes Mother     Hypertension Mother     Hypothyroidism Mother     Diabetes type I Mother     Aneurysm Father         Abd Aorta    Heart disease Father         CABG    No Known Problems Maternal Grandmother     No Known Problems Maternal Grandfather     Anxiety disorder Paternal Grandmother     No Known Problems Paternal Grandfather     Lung cancer Paternal Aunt         age unknown    Breast cancer Neg Hx      Social History  "    Tobacco Use    Smoking status: Never    Smokeless tobacco: Never   Substance Use Topics    Alcohol use: Yes     Alcohol/week: 1.0 standard drink of alcohol     Types: 1 Glasses of wine per week     Comment: social     Allergies   Allergen Reactions    Augmentin [Amoxicillin-Pot Clavulanate] Abdominal Pain         Current Outpatient Medications:     aspirin 81 MG tablet, Take 1 tablet by mouth daily, Disp: , Rfl:     atorvastatin (LIPITOR) 40 mg tablet, Take 1 tablet (40 mg total) by mouth daily, Disp: 90 tablet, Rfl: 1    Cholecalciferol 50 MCG (2000 UT) CAPS, Take 1 capsule by mouth daily, Disp: , Rfl:     Cyanocobalamin (VITAMIN B12 PO), Take by mouth, Disp: , Rfl:     FLUoxetine (PROzac) 20 mg capsule, Take 1 capsule (20 mg total) by mouth daily, Disp: 90 capsule, Rfl: 3    FLUoxetine (PROzac) 40 MG capsule, Take 1 capsule (40 mg total) by mouth daily, Disp: 90 capsule, Rfl: 1    Multiple Vitamin (DAILY VALUE MULTIVITAMIN) TABS, Take 1 tablet by mouth daily, Disp: , Rfl:     Omega-3 Fatty Acids (FISH OIL) 600 MG CAPS, Take by mouth, Disp: , Rfl:     vitamin E, tocopherol, 400 units capsule, Take by mouth, Disp: , Rfl:     ALPRAZolam (XANAX) 0.5 mg tablet, Take 1 tablet (0.5 mg total) by mouth 2 (two) times a day as needed for anxiety (Patient not taking: Reported on 2/21/2025), Disp: 30 tablet, Rfl: 0    Current Facility-Administered Medications:     denosumab (PROLIA) subcutaneous injection 60 mg, 60 mg, Subcutaneous, Q6 Months, Franklin Garner MD, 60 mg at 08/20/24 1531    Review of Systems   Musculoskeletal:  Positive for back pain. Negative for arthralgias and gait problem.   Neurological:  Negative for weakness.   All other systems reviewed and are negative.      Physical Exam:  Body mass index is 27.46 kg/m².  /80   Pulse 68   Ht 5' 5\" (1.651 m)   Wt 74.8 kg (165 lb)   SpO2 98%   BMI 27.46 kg/m²    Wt Readings from Last 3 Encounters:   02/21/25 74.8 kg (165 lb)   02/11/25 76.6 kg (168 lb 12.8 " oz)   08/20/24 74.1 kg (163 lb 6.4 oz)       Physical Exam  Vitals reviewed.   Constitutional:       Appearance: Normal appearance.   Cardiovascular:      Rate and Rhythm: Normal rate.   Pulmonary:      Effort: Pulmonary effort is normal.   Neurological:      Mental Status: She is alert and oriented to person, place, and time.   Psychiatric:         Mood and Affect: Mood normal.         Labs:     Component      Latest Ref Rng 2/19/2025   Sodium      135 - 147 mmol/L 138    Potassium      3.5 - 5.3 mmol/L 3.9    Chloride      96 - 108 mmol/L 102    Carbon Dioxide      21 - 32 mmol/L 29    ANION GAP      4 - 13 mmol/L 7    BUN      5 - 25 mg/dL 15    Creatinine      0.60 - 1.30 mg/dL 0.80    GLUCOSE, FASTING      65 - 99 mg/dL 95    Calcium      8.4 - 10.2 mg/dL 9.7    AST      13 - 39 U/L 22    ALT      7 - 52 U/L 30    ALK PHOS      34 - 104 U/L 88    Total Protein      6.4 - 8.4 g/dL 7.3    Albumin      3.5 - 5.0 g/dL 4.4    Total Bilirubin      0.20 - 1.00 mg/dL 0.69    GFR, Calculated      ml/min/1.73sq m 77    Cholesterol      See Comment mg/dL 231 (H)    Triglycerides      See Comment mg/dL 166 (H)    HDL      >=50 mg/dL 65    LDL Calculated      0 - 100 mg/dL 133 (H)    VITAMIN D, 25-HYDROXY      30.0 - 100.0 ng/mL 40.9       Legend:  (H) High    Orders Placed This Encounter   Procedures    DXA bone density spine hip and pelvis     Standing Status:   Future     Expected Date:   6/13/2025     Expiration Date:   2/21/2029     Scheduling Instructions:      Please do not wear jewelry on the day of the exam. Please wear comfortable clothing with no metal buttons, zippers, snaps or an underwire bra.             Do not take any calcium supplements or multi-vitamins 24 hours prior to your test.      Your study cannot be performed if you take your calcium supplement or multi-vitamin within 24 hours of your scheduled Dexa scan examination.            Please bring your physician order, insurance cards, a form of photo ID  and a list of your medications with you.             Please remember to arrive 15 minutes prior to your appointment time in order to register.            To schedule this appointment, please contact Central Scheduling at [phone].          Comprehensive metabolic panel     This is a patient instruction: Patient fasting for 8 hours or longer recommended.     Standing Status:   Future     Expected Date:   8/21/2025     Expiration Date:   2/21/2026       There are no Patient Instructions on file for this visit.    Discussed with the patient and all questioned fully answered. She will call me if any problems arise.    DAVON Velasquez

## 2025-03-13 DIAGNOSIS — F41.9 ANXIETY: ICD-10-CM

## 2025-03-14 RX ORDER — ALPRAZOLAM 0.5 MG
0.5 TABLET ORAL 2 TIMES DAILY PRN
Qty: 30 TABLET | Refills: 0 | Status: SHIPPED | OUTPATIENT
Start: 2025-03-14 | End: 2025-04-13

## 2025-03-14 NOTE — TELEPHONE ENCOUNTER
Requested Prescriptions     Pending Prescriptions Disp Refills    ALPRAZolam (XANAX) 0.5 mg tablet 30 tablet 0     Sig: Take 1 tablet (0.5 mg total) by mouth 2 (two) times a day as needed for anxiety

## 2025-05-10 DIAGNOSIS — F32.89 OTHER DEPRESSION: ICD-10-CM

## 2025-05-11 RX ORDER — FLUOXETINE HYDROCHLORIDE 40 MG/1
40 CAPSULE ORAL DAILY
Qty: 90 CAPSULE | Refills: 0 | Status: SHIPPED | OUTPATIENT
Start: 2025-05-11

## 2025-07-12 DIAGNOSIS — E78.2 MIXED HYPERLIPIDEMIA: ICD-10-CM

## 2025-07-12 DIAGNOSIS — F41.9 ANXIETY: ICD-10-CM

## 2025-07-12 DIAGNOSIS — F32.89 OTHER DEPRESSION: ICD-10-CM

## 2025-07-14 RX ORDER — ATORVASTATIN CALCIUM 40 MG/1
40 TABLET, FILM COATED ORAL DAILY
Qty: 90 TABLET | Refills: 0 | Status: SHIPPED | OUTPATIENT
Start: 2025-07-14

## 2025-07-18 ENCOUNTER — HOSPITAL ENCOUNTER (OUTPATIENT)
Dept: MAMMOGRAPHY | Facility: MEDICAL CENTER | Age: 65
Discharge: HOME | End: 2025-07-18
Payer: MEDICARE

## 2025-07-18 VITALS — WEIGHT: 165 LBS | HEIGHT: 65 IN | BODY MASS INDEX: 27.49 KG/M2

## 2025-07-18 DIAGNOSIS — Z12.31 ENCOUNTER FOR SCREENING MAMMOGRAM FOR MALIGNANT NEOPLASM OF BREAST: ICD-10-CM

## 2025-07-18 PROCEDURE — 77067 SCR MAMMO BI INCL CAD: CPT

## 2025-07-18 PROCEDURE — 77063 BREAST TOMOSYNTHESIS BI: CPT

## 2025-07-22 ENCOUNTER — RESULTS FOLLOW-UP (OUTPATIENT)
Dept: FAMILY MEDICINE CLINIC | Facility: CLINIC | Age: 65
End: 2025-07-22

## 2025-07-22 NOTE — TELEPHONE ENCOUNTER
----- Message from Elsa Chen PA-C sent at 7/22/2025  1:36 PM EDT -----  Please let pt know mammo was WNL. Thank you!  ----- Message -----  From: Bradley Landon Kocher, MD  Sent: 7/22/2025  12:25 PM EDT  To: Elsa Chen PA-C

## 2025-08-01 ENCOUNTER — HOSPITAL ENCOUNTER (OUTPATIENT)
Dept: BONE DENSITY | Facility: MEDICAL CENTER | Age: 65
Discharge: HOME | End: 2025-08-01
Payer: MEDICARE

## 2025-08-01 DIAGNOSIS — M81.0 AGE-RELATED OSTEOPOROSIS WITHOUT CURRENT PATHOLOGICAL FRACTURE: ICD-10-CM

## 2025-08-01 PROCEDURE — 77080 DXA BONE DENSITY AXIAL: CPT

## 2025-08-13 ENCOUNTER — TELEPHONE (OUTPATIENT)
Dept: ENDOCRINOLOGY | Facility: CLINIC | Age: 65
End: 2025-08-13

## 2025-08-19 ENCOUNTER — APPOINTMENT (OUTPATIENT)
Dept: LAB | Facility: CLINIC | Age: 65
End: 2025-08-19
Payer: MEDICARE

## 2025-08-19 DIAGNOSIS — M81.0 AGE-RELATED OSTEOPOROSIS WITHOUT CURRENT PATHOLOGICAL FRACTURE: ICD-10-CM

## 2025-08-19 DIAGNOSIS — E78.2 MIXED HYPERLIPIDEMIA: ICD-10-CM

## 2025-08-19 DIAGNOSIS — Z00.6 ENCOUNTER FOR EXAMINATION FOR NORMAL COMPARISON OR CONTROL IN CLINICAL RESEARCH PROGRAM: ICD-10-CM

## 2025-08-19 LAB
ALBUMIN SERPL BCG-MCNC: 4.1 G/DL (ref 3.5–5)
ALP SERPL-CCNC: 91 U/L (ref 34–104)
ALT SERPL W P-5'-P-CCNC: 31 U/L (ref 7–52)
ANION GAP SERPL CALCULATED.3IONS-SCNC: 8 MMOL/L (ref 4–13)
AST SERPL W P-5'-P-CCNC: 22 U/L (ref 13–39)
BASOPHILS # BLD AUTO: 0.06 THOUSANDS/ÂΜL (ref 0–0.1)
BASOPHILS NFR BLD AUTO: 1 % (ref 0–1)
BILIRUB SERPL-MCNC: 0.61 MG/DL (ref 0.2–1)
BUN SERPL-MCNC: 11 MG/DL (ref 5–25)
CALCIUM SERPL-MCNC: 9.2 MG/DL (ref 8.4–10.2)
CHLORIDE SERPL-SCNC: 103 MMOL/L (ref 96–108)
CHOLEST SERPL-MCNC: 213 MG/DL (ref ?–200)
CO2 SERPL-SCNC: 28 MMOL/L (ref 21–32)
CREAT SERPL-MCNC: 0.77 MG/DL (ref 0.6–1.3)
EOSINOPHIL # BLD AUTO: 0.17 THOUSAND/ÂΜL (ref 0–0.61)
EOSINOPHIL NFR BLD AUTO: 2 % (ref 0–6)
ERYTHROCYTE [DISTWIDTH] IN BLOOD BY AUTOMATED COUNT: 13.2 % (ref 11.6–15.1)
GFR SERPL CREATININE-BSD FRML MDRD: 80 ML/MIN/1.73SQ M
GLUCOSE P FAST SERPL-MCNC: 93 MG/DL (ref 65–99)
HCT VFR BLD AUTO: 38 % (ref 34.8–46.1)
HDLC SERPL-MCNC: 59 MG/DL
HGB BLD-MCNC: 12.8 G/DL (ref 11.5–15.4)
IMM GRANULOCYTES # BLD AUTO: 0.02 THOUSAND/UL (ref 0–0.2)
IMM GRANULOCYTES NFR BLD AUTO: 0 % (ref 0–2)
LDLC SERPL CALC-MCNC: 121 MG/DL (ref 0–100)
LYMPHOCYTES # BLD AUTO: 2.74 THOUSANDS/ÂΜL (ref 0.6–4.47)
LYMPHOCYTES NFR BLD AUTO: 39 % (ref 14–44)
MCH RBC QN AUTO: 30.4 PG (ref 26.8–34.3)
MCHC RBC AUTO-ENTMCNC: 33.7 G/DL (ref 31.4–37.4)
MCV RBC AUTO: 90 FL (ref 82–98)
MONOCYTES # BLD AUTO: 0.54 THOUSAND/ÂΜL (ref 0.17–1.22)
MONOCYTES NFR BLD AUTO: 8 % (ref 4–12)
NEUTROPHILS # BLD AUTO: 3.5 THOUSANDS/ÂΜL (ref 1.85–7.62)
NEUTS SEG NFR BLD AUTO: 50 % (ref 43–75)
NONHDLC SERPL-MCNC: 154 MG/DL
NRBC BLD AUTO-RTO: 0 /100 WBCS
PLATELET # BLD AUTO: 291 THOUSANDS/UL (ref 149–390)
PMV BLD AUTO: 9.5 FL (ref 8.9–12.7)
POTASSIUM SERPL-SCNC: 3.9 MMOL/L (ref 3.5–5.3)
PROT SERPL-MCNC: 7 G/DL (ref 6.4–8.4)
RBC # BLD AUTO: 4.21 MILLION/UL (ref 3.81–5.12)
SODIUM SERPL-SCNC: 139 MMOL/L (ref 135–147)
TRIGL SERPL-MCNC: 166 MG/DL (ref ?–150)
WBC # BLD AUTO: 7.03 THOUSAND/UL (ref 4.31–10.16)

## 2025-08-19 PROCEDURE — 80053 COMPREHEN METABOLIC PANEL: CPT

## 2025-08-19 PROCEDURE — 85025 COMPLETE CBC W/AUTO DIFF WBC: CPT

## 2025-08-19 PROCEDURE — 80061 LIPID PANEL: CPT

## 2025-08-19 PROCEDURE — 36415 COLL VENOUS BLD VENIPUNCTURE: CPT
